# Patient Record
Sex: MALE | Race: WHITE
[De-identification: names, ages, dates, MRNs, and addresses within clinical notes are randomized per-mention and may not be internally consistent; named-entity substitution may affect disease eponyms.]

---

## 2022-03-18 PROBLEM — G89.29 CHRONIC NECK PAIN: Status: ACTIVE | Noted: 2021-04-26

## 2022-03-18 PROBLEM — N40.1 BENIGN LOCALIZED PROSTATIC HYPERPLASIA WITH LOWER URINARY TRACT SYMPTOMS (LUTS): Status: ACTIVE | Noted: 2021-11-08

## 2022-03-18 PROBLEM — I10 PRIMARY HYPERTENSION: Status: ACTIVE | Noted: 2021-12-16

## 2022-03-18 PROBLEM — M54.42 CHRONIC BILATERAL LOW BACK PAIN WITH LEFT-SIDED SCIATICA: Status: ACTIVE | Noted: 2021-04-26

## 2022-03-18 PROBLEM — F33.9 RECURRENT DEPRESSION (HCC): Status: ACTIVE | Noted: 2019-01-22

## 2022-03-18 PROBLEM — G89.29 CHRONIC BILATERAL LOW BACK PAIN WITH LEFT-SIDED SCIATICA: Status: ACTIVE | Noted: 2021-04-26

## 2022-03-18 PROBLEM — M54.40 BACK PAIN OF LUMBAR REGION WITH SCIATICA: Status: ACTIVE | Noted: 2020-10-27

## 2022-03-18 PROBLEM — J30.9 ALLERGIC RHINITIS: Status: ACTIVE | Noted: 2019-01-22

## 2022-03-18 PROBLEM — J06.9 VIRAL URI: Status: ACTIVE | Noted: 2021-11-08

## 2022-03-18 PROBLEM — M54.2 CHRONIC NECK PAIN: Status: ACTIVE | Noted: 2021-04-26

## 2022-03-19 PROBLEM — K21.9 GASTROESOPHAGEAL REFLUX DISEASE WITHOUT ESOPHAGITIS: Status: ACTIVE | Noted: 2021-04-26

## 2022-03-19 PROBLEM — F41.9 ANXIETY: Status: ACTIVE | Noted: 2021-12-16

## 2022-03-19 PROBLEM — G89.29 CHRONIC MIDLINE THORACIC BACK PAIN: Status: ACTIVE | Noted: 2020-10-27

## 2022-03-19 PROBLEM — R41.840 ATTENTION DEFICIT: Status: ACTIVE | Noted: 2019-12-16

## 2022-03-19 PROBLEM — R06.09 CHRONIC DYSPNEA: Status: ACTIVE | Noted: 2021-09-14

## 2022-03-19 PROBLEM — M54.6 CHRONIC MIDLINE THORACIC BACK PAIN: Status: ACTIVE | Noted: 2020-10-27

## 2022-03-19 PROBLEM — J98.6 PARALYSIS, DIAPHRAGM: Status: ACTIVE | Noted: 2020-10-27

## 2022-03-19 PROBLEM — R36.1 HEMATOSPERMIA: Status: ACTIVE | Noted: 2021-11-08

## 2022-03-19 PROBLEM — F51.04 PSYCHOPHYSIOLOGIC INSOMNIA: Status: ACTIVE | Noted: 2019-01-22

## 2022-03-19 PROBLEM — U09.9 POST-COVID SYNDROME: Status: ACTIVE | Noted: 2021-09-14

## 2022-03-19 PROBLEM — R07.89 OTHER CHEST PAIN: Status: ACTIVE | Noted: 2019-11-18

## 2022-03-19 PROBLEM — R58 EXCESSIVE BLEEDING: Status: ACTIVE | Noted: 2019-12-16

## 2022-03-20 PROBLEM — I51.7 MILD CONCENTRIC LEFT VENTRICULAR HYPERTROPHY: Status: ACTIVE | Noted: 2021-11-08

## 2022-03-20 PROBLEM — R06.09 DOE (DYSPNEA ON EXERTION): Status: ACTIVE | Noted: 2020-10-27

## 2022-04-21 PROBLEM — Z12.11 SCREEN FOR COLON CANCER: Status: ACTIVE | Noted: 2022-04-21

## 2022-05-21 PROBLEM — Z12.11 SCREEN FOR COLON CANCER: Status: RESOLVED | Noted: 2022-04-21 | Resolved: 2022-05-21

## 2022-10-13 DIAGNOSIS — F41.9 ANXIETY DISORDER, UNSPECIFIED TYPE: Primary | ICD-10-CM

## 2022-10-14 RX ORDER — ESCITALOPRAM OXALATE 10 MG/1
10 TABLET ORAL DAILY
Qty: 30 TABLET | Refills: 0 | Status: SHIPPED | OUTPATIENT
Start: 2022-10-14 | End: 2022-10-31 | Stop reason: SDUPTHER

## 2022-10-31 ENCOUNTER — OFFICE VISIT (OUTPATIENT)
Dept: FAMILY MEDICINE CLINIC | Facility: CLINIC | Age: 52
End: 2022-10-31
Payer: COMMERCIAL

## 2022-10-31 VITALS
WEIGHT: 214.8 LBS | HEART RATE: 85 BPM | HEIGHT: 71 IN | DIASTOLIC BLOOD PRESSURE: 79 MMHG | BODY MASS INDEX: 30.07 KG/M2 | OXYGEN SATURATION: 96 % | TEMPERATURE: 97.7 F | SYSTOLIC BLOOD PRESSURE: 122 MMHG

## 2022-10-31 DIAGNOSIS — N40.1 BENIGN PROSTATIC HYPERPLASIA WITH URINARY FREQUENCY: ICD-10-CM

## 2022-10-31 DIAGNOSIS — I10 ESSENTIAL (PRIMARY) HYPERTENSION: ICD-10-CM

## 2022-10-31 DIAGNOSIS — R35.0 BENIGN PROSTATIC HYPERPLASIA WITH URINARY FREQUENCY: ICD-10-CM

## 2022-10-31 DIAGNOSIS — F41.9 ANXIETY DISORDER, UNSPECIFIED TYPE: Primary | ICD-10-CM

## 2022-10-31 DIAGNOSIS — Z12.11 SCREEN FOR COLON CANCER: ICD-10-CM

## 2022-10-31 PROCEDURE — 3078F DIAST BP <80 MM HG: CPT | Performed by: FAMILY MEDICINE

## 2022-10-31 PROCEDURE — 3074F SYST BP LT 130 MM HG: CPT | Performed by: FAMILY MEDICINE

## 2022-10-31 PROCEDURE — 99214 OFFICE O/P EST MOD 30 MIN: CPT | Performed by: FAMILY MEDICINE

## 2022-10-31 RX ORDER — ESCITALOPRAM OXALATE 10 MG/1
10 TABLET ORAL DAILY
Qty: 90 TABLET | Refills: 3 | Status: SHIPPED | OUTPATIENT
Start: 2022-10-31

## 2022-10-31 RX ORDER — ESCITALOPRAM OXALATE 10 MG/1
10 TABLET ORAL DAILY
Qty: 30 TABLET | Refills: 0 | Status: SHIPPED | OUTPATIENT
Start: 2022-10-31 | End: 2022-10-31 | Stop reason: SDUPTHER

## 2022-10-31 ASSESSMENT — PATIENT HEALTH QUESTIONNAIRE - PHQ9
6. FEELING BAD ABOUT YOURSELF - OR THAT YOU ARE A FAILURE OR HAVE LET YOURSELF OR YOUR FAMILY DOWN: 0
9. THOUGHTS THAT YOU WOULD BE BETTER OFF DEAD, OR OF HURTING YOURSELF: 0
SUM OF ALL RESPONSES TO PHQ9 QUESTIONS 1 & 2: 0
8. MOVING OR SPEAKING SO SLOWLY THAT OTHER PEOPLE COULD HAVE NOTICED. OR THE OPPOSITE, BEING SO FIGETY OR RESTLESS THAT YOU HAVE BEEN MOVING AROUND A LOT MORE THAN USUAL: 0
3. TROUBLE FALLING OR STAYING ASLEEP: 0
SUM OF ALL RESPONSES TO PHQ QUESTIONS 1-9: 6
4. FEELING TIRED OR HAVING LITTLE ENERGY: 0
SUM OF ALL RESPONSES TO PHQ QUESTIONS 1-9: 6
SUM OF ALL RESPONSES TO PHQ QUESTIONS 1-9: 6
10. IF YOU CHECKED OFF ANY PROBLEMS, HOW DIFFICULT HAVE THESE PROBLEMS MADE IT FOR YOU TO DO YOUR WORK, TAKE CARE OF THINGS AT HOME, OR GET ALONG WITH OTHER PEOPLE: 0
1. LITTLE INTEREST OR PLEASURE IN DOING THINGS: 0
2. FEELING DOWN, DEPRESSED OR HOPELESS: 0
5. POOR APPETITE OR OVEREATING: 3
SUM OF ALL RESPONSES TO PHQ QUESTIONS 1-9: 6
7. TROUBLE CONCENTRATING ON THINGS, SUCH AS READING THE NEWSPAPER OR WATCHING TELEVISION: 3

## 2022-10-31 ASSESSMENT — ENCOUNTER SYMPTOMS
CHEST TIGHTNESS: 0
SHORTNESS OF BREATH: 0
BLOOD IN STOOL: 0
ABDOMINAL PAIN: 0

## 2022-10-31 NOTE — PROGRESS NOTES
Marilyn  _______________________________________  MD Boni Richardson, DO  Danette Perez, MD Augusto Santana MD    [address] , [address] [place] [address]  Phone: [phone]  Fax: [phone]    Marky Benson (:  1970) is a 47 y.o. male,Established patient, here for evaluation of the following chief complaint(s):  No chief complaint on file. ASSESSMENT/PLAN:    1. Anxiety disorder, unspecified type  Stable, continue current regimen. - escitalopram (LEXAPRO) 10 MG tablet; Take 1 tablet by mouth daily  Dispense: 30 tablet; Refill: 0    2. Essential (primary) hypertension  He is in remission at this point. Will trend out at future OV.   - Comprehensive Metabolic Panel; Future  - Lipid Panel; Future    3. Benign prostatic hyperplasia with urinary frequency  Stable, recheck PSA on FU. 4. Screen for colon cancer  Willing to get this done now, will refer. - [facility]    FU 12m    Subjective   SUBJECTIVE/OBJECTIVE:    HTN:  BP better on lisinopril 5. In fact he's cleaned up his diet a bit and he's off the medication and BP is normal.   BP Readings from Last 3 Encounters:   10/31/22 122/79   22 (!) 130/93   21 (!) 126/90     Lab Results   Component Value Date/Time     2021 11:34 AM    K 4.6 2021 11:34 AM     2021 11:34 AM    CO2 24 2021 11:34 AM    BUN 14 2021 11:34 AM    CREATININE 0.91 2021 11:34 AM    GLUCOSE 97 2021 11:34 AM    CALCIUM 9.5 2021 11:34 AM        Anxiety: On chronic Lexparo 10 for this. Mood is good. States he is sleeping alright. BPH: PSA trending down, 3.4->1.8     Lab Results   Component Value Date    PSA 1.8 2022    PSA 3.4 2021       HM:     [place]: Due . .. still. Willing to get this done. Needs a new referral.   Lipids due  COVID: Thinking about this but probably had COVID over the summer. Review of Systems   Constitutional:  Negative for chills and fever. Respiratory:  Negative for chest tightness and shortness of breath. Gastrointestinal:  Negative for abdominal pain and blood in stool. Genitourinary:  Negative for hematuria. Objective   Physical Exam  Vitals and nursing note reviewed. Constitutional:       General: He is not in acute distress. Appearance: Normal appearance. He is not ill-appearing. HENT:      Head: Normocephalic and atraumatic. Right Ear: External ear normal.      Left Ear: External ear normal.      Mouth/Throat:      Mouth: Mucous membranes are moist.   Eyes:      General: No scleral icterus. Right eye: No discharge. Left eye: No discharge. Extraocular Movements: Extraocular movements intact. Pupils: Pupils are equal, round, and reactive to light. Cardiovascular:      Rate and Rhythm: Normal rate and regular rhythm. Pulses: Normal pulses. Heart sounds: No murmur heard. No friction rub. No gallop. Pulmonary:      Effort: Pulmonary effort is normal. No respiratory distress. Breath sounds: Normal breath sounds. Abdominal:      General: Abdomen is flat. Bowel sounds are normal.      Palpations: Abdomen is soft. Tenderness: There is no abdominal tenderness. There is no right CVA tenderness or left CVA tenderness. Musculoskeletal:         General: No swelling or tenderness. Normal range of motion. Cervical back: Normal range of motion and neck supple. No rigidity. Right lower leg: No edema. Left lower leg: No edema. Skin:     General: Skin is warm and dry. Coloration: Skin is not jaundiced or pale. Neurological:      General: No focal deficit present. Mental Status: He is alert and oriented to person, place, and time. Mental status is at baseline. Cranial Nerves: No cranial nerve deficit.       Gait: Gait normal.      Deep Tendon Reflexes: Reflexes normal. Psychiatric:         Mood and Affect: Mood normal.         Behavior: Behavior normal.         Thought Content: Thought content normal.                An electronic signature was used to authenticate this note.     --Dandre Bates MD

## 2022-11-30 PROBLEM — Z12.11 SCREEN FOR COLON CANCER: Status: RESOLVED | Noted: 2022-04-21 | Resolved: 2022-11-30

## 2023-08-15 ENCOUNTER — OFFICE VISIT (OUTPATIENT)
Dept: FAMILY MEDICINE CLINIC | Facility: CLINIC | Age: 53
End: 2023-08-15
Payer: COMMERCIAL

## 2023-08-15 VITALS
DIASTOLIC BLOOD PRESSURE: 88 MMHG | HEIGHT: 71 IN | WEIGHT: 195 LBS | BODY MASS INDEX: 27.3 KG/M2 | SYSTOLIC BLOOD PRESSURE: 134 MMHG

## 2023-08-15 DIAGNOSIS — B35.1 ONYCHOMYCOSIS: ICD-10-CM

## 2023-08-15 DIAGNOSIS — R35.0 BENIGN PROSTATIC HYPERPLASIA WITH URINARY FREQUENCY: ICD-10-CM

## 2023-08-15 DIAGNOSIS — N40.1 BENIGN PROSTATIC HYPERPLASIA WITH URINARY FREQUENCY: ICD-10-CM

## 2023-08-15 DIAGNOSIS — F41.9 ANXIETY DISORDER, UNSPECIFIED TYPE: Primary | ICD-10-CM

## 2023-08-15 DIAGNOSIS — L70.9 ACNE, UNSPECIFIED ACNE TYPE: ICD-10-CM

## 2023-08-15 PROBLEM — Z13.220 SCREENING, LIPID: Status: ACTIVE | Noted: 2022-04-21

## 2023-08-15 PROCEDURE — 3079F DIAST BP 80-89 MM HG: CPT | Performed by: FAMILY MEDICINE

## 2023-08-15 PROCEDURE — 3075F SYST BP GE 130 - 139MM HG: CPT | Performed by: FAMILY MEDICINE

## 2023-08-15 PROCEDURE — 99214 OFFICE O/P EST MOD 30 MIN: CPT | Performed by: FAMILY MEDICINE

## 2023-08-15 RX ORDER — TERBINAFINE HYDROCHLORIDE 250 MG/1
250 TABLET ORAL DAILY
Qty: 30 TABLET | Refills: 3 | Status: SHIPPED | OUTPATIENT
Start: 2023-08-15 | End: 2023-12-13

## 2023-08-15 RX ORDER — CLINDAMYCIN AND BENZOYL PEROXIDE 10; 50 MG/G; MG/G
GEL TOPICAL
Qty: 50 G | Refills: 1 | Status: SHIPPED | OUTPATIENT
Start: 2023-08-15

## 2023-08-15 SDOH — ECONOMIC STABILITY: INCOME INSECURITY: HOW HARD IS IT FOR YOU TO PAY FOR THE VERY BASICS LIKE FOOD, HOUSING, MEDICAL CARE, AND HEATING?: PATIENT DECLINED

## 2023-08-15 SDOH — ECONOMIC STABILITY: HOUSING INSECURITY
IN THE LAST 12 MONTHS, WAS THERE A TIME WHEN YOU DID NOT HAVE A STEADY PLACE TO SLEEP OR SLEPT IN A SHELTER (INCLUDING NOW)?: PATIENT REFUSED

## 2023-08-15 SDOH — ECONOMIC STABILITY: FOOD INSECURITY: WITHIN THE PAST 12 MONTHS, THE FOOD YOU BOUGHT JUST DIDN'T LAST AND YOU DIDN'T HAVE MONEY TO GET MORE.: PATIENT DECLINED

## 2023-08-15 SDOH — ECONOMIC STABILITY: FOOD INSECURITY: WITHIN THE PAST 12 MONTHS, YOU WORRIED THAT YOUR FOOD WOULD RUN OUT BEFORE YOU GOT MONEY TO BUY MORE.: PATIENT DECLINED

## 2023-08-15 ASSESSMENT — PATIENT HEALTH QUESTIONNAIRE - PHQ9
SUM OF ALL RESPONSES TO PHQ QUESTIONS 1-9: 0
5. POOR APPETITE OR OVEREATING: 0
SUM OF ALL RESPONSES TO PHQ9 QUESTIONS 1 & 2: 0
SUM OF ALL RESPONSES TO PHQ QUESTIONS 1-9: 0
2. FEELING DOWN, DEPRESSED OR HOPELESS: 0
8. MOVING OR SPEAKING SO SLOWLY THAT OTHER PEOPLE COULD HAVE NOTICED. OR THE OPPOSITE, BEING SO FIGETY OR RESTLESS THAT YOU HAVE BEEN MOVING AROUND A LOT MORE THAN USUAL: 0
SUM OF ALL RESPONSES TO PHQ QUESTIONS 1-9: 0
4. FEELING TIRED OR HAVING LITTLE ENERGY: 0
3. TROUBLE FALLING OR STAYING ASLEEP: 0
1. LITTLE INTEREST OR PLEASURE IN DOING THINGS: 0
9. THOUGHTS THAT YOU WOULD BE BETTER OFF DEAD, OR OF HURTING YOURSELF: 0
7. TROUBLE CONCENTRATING ON THINGS, SUCH AS READING THE NEWSPAPER OR WATCHING TELEVISION: 0
6. FEELING BAD ABOUT YOURSELF - OR THAT YOU ARE A FAILURE OR HAVE LET YOURSELF OR YOUR FAMILY DOWN: 0
SUM OF ALL RESPONSES TO PHQ QUESTIONS 1-9: 0

## 2023-08-15 ASSESSMENT — ENCOUNTER SYMPTOMS
SHORTNESS OF BREATH: 0
CHEST TIGHTNESS: 0
BLOOD IN STOOL: 0
ABDOMINAL PAIN: 0

## 2023-08-15 NOTE — PROGRESS NOTES
[address]  _______________________________________  MD Aiden Turner, DO  Julia Mora, NP    Ellaree Hamman, MD Aliya Galeana, MD    [address], [address]  Phone: [phone]  Fax: [phone]    Jadon Fry (:  1970) is a 47 y.o. male,Established patient, here for evaluation of the following chief complaint(s):  Rash and Nail Problem         ASSESSMENT/PLAN:    1. Anxiety disorder, unspecified type  Stable, continue current regimen. 2. Benign prostatic hyperplasia with urinary frequency  Reheck at physical in 2m. 3. Onychomycosis  Will do oral lamisil, small toe so should be shorter course, will check LFTs on FU. - terbinafine (LAMISIL) 250 MG tablet; Take 1 tablet by mouth daily  Dispense: 30 tablet; Refill: 3    4. Acne, unspecified acne type  Try clinda with BP. Will recheck on FU. - clindamycin-benzoyl peroxide (BENZACLIN) 1-5 % gel; Apply topically 2 times daily. Dispense: 50 g; Refill: 1    FU 2m as planned    Subjective   SUBJECTIVE/OBJECTIVE:    HTN: Resolved. BP Readings from Last 3 Encounters:   08/15/23 134/88   10/31/22 122/79   22 (!) 130/93     Anxiety: On chronic Lexparo 10 for this. Mood is good. States he is sleeping alright. BPH: PSA trending down, 3.4->1.8     Lab Results   Component Value Date    PSA 1.8 2022    PSA 3.4 2021       Rashes: Has a rash on his chest he wants me to check. Also thick fungal nail he'd like to take care of. Lab Results   Component Value Date    ALT 25 2021    AST 20 2021    ALKPHOS 56 2021    BILITOT 0.3 2021     Has tolerated oral lamisil in the past. He is an occasional drinker. Review of Systems   Constitutional:  Negative for chills and fever. Respiratory:  Negative for chest tightness and shortness of breath. Gastrointestinal:  Negative for abdominal pain and blood in stool.    Genitourinary:  Negative

## 2023-09-14 PROBLEM — Z13.220 SCREENING, LIPID: Status: RESOLVED | Noted: 2022-04-21 | Resolved: 2023-09-14

## 2023-10-03 ENCOUNTER — TELEMEDICINE (OUTPATIENT)
Dept: FAMILY MEDICINE CLINIC | Facility: CLINIC | Age: 53
End: 2023-10-03
Payer: COMMERCIAL

## 2023-10-03 DIAGNOSIS — J01.00 ACUTE NON-RECURRENT MAXILLARY SINUSITIS: Primary | ICD-10-CM

## 2023-10-03 DIAGNOSIS — J30.9 ALLERGIC RHINITIS, UNSPECIFIED SEASONALITY, UNSPECIFIED TRIGGER: ICD-10-CM

## 2023-10-03 PROCEDURE — 99214 OFFICE O/P EST MOD 30 MIN: CPT | Performed by: FAMILY MEDICINE

## 2023-10-03 RX ORDER — AZITHROMYCIN 250 MG/1
250 TABLET, FILM COATED ORAL SEE ADMIN INSTRUCTIONS
Qty: 6 TABLET | Refills: 0 | Status: SHIPPED | OUTPATIENT
Start: 2023-10-03 | End: 2023-10-08

## 2023-10-03 ASSESSMENT — ENCOUNTER SYMPTOMS
ABDOMINAL PAIN: 0
RHINORRHEA: 1
SINUS PAIN: 1
SHORTNESS OF BREATH: 0
BLOOD IN STOOL: 0
CHEST TIGHTNESS: 0

## 2023-10-03 NOTE — PROGRESS NOTES
Sandra Stewart, was evaluated through a synchronous (real-time) audio-video encounter. The patient (or guardian if applicable) is aware that this is a billable service, which includes applicable co-pays. This Virtual Visit was conducted with patient's (and/or legal guardian's) consent. Patient identification was verified, and a caregiver was present when appropriate. The patient was located at Home: [address] [address]  Provider was located at [facility] ([address] [place] [address]): [address] [place] [address]      Sandra Stewart (:  1970) is a Established patient, presenting virtually for evaluation of the following:    Chief Complaint   Patient presents with    Cough     X 1 week     Congestion    Sore Throat         Assessment & Plan   Below is the assessment and plan developed based on review of pertinent history, physical exam, labs, studies, and medications. 1. Acute non-recurrent maxillary sinusitis  Will cover with Zpack due to PCN allergy. Take with food. - azithromycin (ZITHROMAX) 250 MG tablet; Take 1 tablet by mouth See Admin Instructions for 5 days 500mg on day 1 followed by 250mg on days 2 - 5  Dispense: 6 tablet; Refill: 0    2. Allergic rhinitis, unspecified seasonality, unspecified trigger  Continue second gen anithistamine. Call next week if not better. FU PRN      Subjective     On the line with the above:    Feels like he has a sinus infection. Has been having thick green mucus x 1 week. Allergy triggers are making this worse but avoiding triggers doesn't fix him. Has been taking OTC Zyrtec and this has not clearedup his symptoms. Has PCN allergy per his mom, reaction unknown. Review of Systems   Constitutional:  Negative for chills and fever. HENT:  Positive for postnasal drip, rhinorrhea and sinus pain. Respiratory:  Negative for chest tightness and shortness of breath.     Gastrointestinal:  Negative for abdominal pain and

## 2023-10-31 ENCOUNTER — OFFICE VISIT (OUTPATIENT)
Dept: FAMILY MEDICINE CLINIC | Facility: CLINIC | Age: 53
End: 2023-10-31
Payer: COMMERCIAL

## 2023-10-31 VITALS
HEIGHT: 71 IN | WEIGHT: 196 LBS | SYSTOLIC BLOOD PRESSURE: 121 MMHG | DIASTOLIC BLOOD PRESSURE: 89 MMHG | BODY MASS INDEX: 27.44 KG/M2 | HEART RATE: 68 BPM

## 2023-10-31 DIAGNOSIS — Z00.00 ROUTINE GENERAL MEDICAL EXAMINATION AT A HEALTH CARE FACILITY: Primary | ICD-10-CM

## 2023-10-31 DIAGNOSIS — R68.82 LOW LIBIDO: ICD-10-CM

## 2023-10-31 DIAGNOSIS — Z00.00 ROUTINE GENERAL MEDICAL EXAMINATION AT A HEALTH CARE FACILITY: ICD-10-CM

## 2023-10-31 DIAGNOSIS — Z23 NEED FOR VACCINATION: ICD-10-CM

## 2023-10-31 DIAGNOSIS — Z77.21 EXPOSURE TO POTENTIALLY HAZARDOUS BODY FLUIDS: ICD-10-CM

## 2023-10-31 LAB
ALBUMIN SERPL-MCNC: 4 G/DL (ref 3.5–5)
ALBUMIN/GLOB SERPL: 1.4 (ref 0.4–1.6)
ALP SERPL-CCNC: 48 U/L (ref 50–136)
ALT SERPL-CCNC: 23 U/L (ref 12–65)
ANION GAP SERPL CALC-SCNC: 4 MMOL/L (ref 2–11)
AST SERPL-CCNC: 16 U/L (ref 15–37)
BASOPHILS # BLD: 0 K/UL (ref 0–0.2)
BASOPHILS NFR BLD: 1 % (ref 0–2)
BILIRUB SERPL-MCNC: 0.2 MG/DL (ref 0.2–1.1)
BILIRUBIN, URINE, POC: NEGATIVE
BLOOD URINE, POC: NEGATIVE
BUN SERPL-MCNC: 20 MG/DL (ref 6–23)
CALCIUM SERPL-MCNC: 9.3 MG/DL (ref 8.3–10.4)
CHLORIDE SERPL-SCNC: 107 MMOL/L (ref 101–110)
CHOLEST SERPL-MCNC: 204 MG/DL
CO2 SERPL-SCNC: 28 MMOL/L (ref 21–32)
CREAT SERPL-MCNC: 1.1 MG/DL (ref 0.8–1.5)
DIFFERENTIAL METHOD BLD: ABNORMAL
EOSINOPHIL # BLD: 0 K/UL (ref 0–0.8)
EOSINOPHIL NFR BLD: 0 % (ref 0.5–7.8)
ERYTHROCYTE [DISTWIDTH] IN BLOOD BY AUTOMATED COUNT: 13.1 % (ref 11.9–14.6)
GLOBULIN SER CALC-MCNC: 2.9 G/DL (ref 2.8–4.5)
GLUCOSE SERPL-MCNC: 91 MG/DL (ref 65–100)
GLUCOSE URINE, POC: NEGATIVE
HCT VFR BLD AUTO: 41.5 % (ref 41.1–50.3)
HDLC SERPL-MCNC: 60 MG/DL (ref 40–60)
HDLC SERPL: 3.4
HGB BLD-MCNC: 13.8 G/DL (ref 13.6–17.2)
HIV 1+2 AB+HIV1 P24 AG SERPL QL IA: NONREACTIVE
HIV 1/2 RESULT COMMENT: NORMAL
IMM GRANULOCYTES # BLD AUTO: 0 K/UL (ref 0–0.5)
IMM GRANULOCYTES NFR BLD AUTO: 0 % (ref 0–5)
KETONES, URINE, POC: NEGATIVE
LDLC SERPL CALC-MCNC: 130.6 MG/DL
LEUKOCYTE ESTERASE, URINE, POC: NEGATIVE
LYMPHOCYTES # BLD: 2.1 K/UL (ref 0.5–4.6)
LYMPHOCYTES NFR BLD: 38 % (ref 13–44)
MCH RBC QN AUTO: 31.4 PG (ref 26.1–32.9)
MCHC RBC AUTO-ENTMCNC: 33.3 G/DL (ref 31.4–35)
MCV RBC AUTO: 94.5 FL (ref 82–102)
MONOCYTES # BLD: 0.4 K/UL (ref 0.1–1.3)
MONOCYTES NFR BLD: 7 % (ref 4–12)
NEUTS SEG # BLD: 3 K/UL (ref 1.7–8.2)
NEUTS SEG NFR BLD: 54 % (ref 43–78)
NITRITE, URINE, POC: NEGATIVE
NRBC # BLD: 0 K/UL (ref 0–0.2)
PH, URINE, POC: 6.5 (ref 4.6–8)
PLATELET # BLD AUTO: 319 K/UL (ref 150–450)
PMV BLD AUTO: 10.1 FL (ref 9.4–12.3)
POTASSIUM SERPL-SCNC: 4.6 MMOL/L (ref 3.5–5.1)
PROT SERPL-MCNC: 6.9 G/DL (ref 6.3–8.2)
PROTEIN,URINE, POC: NEGATIVE
RBC # BLD AUTO: 4.39 M/UL (ref 4.23–5.6)
SODIUM SERPL-SCNC: 139 MMOL/L (ref 133–143)
SPECIFIC GRAVITY, URINE, POC: 1.02 (ref 1–1.03)
TRIGL SERPL-MCNC: 67 MG/DL (ref 35–150)
TSH, 3RD GENERATION: 0.88 UIU/ML (ref 0.36–3.74)
URINALYSIS CLARITY, POC: CLEAR
URINALYSIS COLOR, POC: YELLOW
UROBILINOGEN, POC: NORMAL
VLDLC SERPL CALC-MCNC: 13.4 MG/DL (ref 6–23)
WBC # BLD AUTO: 5.5 K/UL (ref 4.3–11.1)

## 2023-10-31 PROCEDURE — 99396 PREV VISIT EST AGE 40-64: CPT | Performed by: FAMILY MEDICINE

## 2023-10-31 PROCEDURE — 81003 URINALYSIS AUTO W/O SCOPE: CPT | Performed by: FAMILY MEDICINE

## 2023-10-31 PROCEDURE — 3074F SYST BP LT 130 MM HG: CPT | Performed by: FAMILY MEDICINE

## 2023-10-31 PROCEDURE — 90674 CCIIV4 VAC NO PRSV 0.5 ML IM: CPT | Performed by: FAMILY MEDICINE

## 2023-10-31 PROCEDURE — 3079F DIAST BP 80-89 MM HG: CPT | Performed by: FAMILY MEDICINE

## 2023-10-31 PROCEDURE — 90471 IMMUNIZATION ADMIN: CPT | Performed by: FAMILY MEDICINE

## 2023-10-31 ASSESSMENT — ENCOUNTER SYMPTOMS
SHORTNESS OF BREATH: 0
ABDOMINAL PAIN: 0
CHEST TIGHTNESS: 0
BLOOD IN STOOL: 0

## 2023-10-31 NOTE — PROGRESS NOTES
[address]  _______________________________________  MD Radha Engle, ROSELINE Vasquez, MD Monica Jung MD    [address], [address]  Phone: [phone]  Fax: [phone]    Germain Montoya (:  1970) is a 47 y.o. male,Established patient, here for evaluation of the following chief complaint(s): Annual Exam         ASSESSMENT/PLAN:    1. Routine general medical examination at a health care facility  Low risk lifestyle. - Comprehensive Metabolic Panel; Future  - CBC with Auto Differential; Future  - TSH; Future  - Lipid Panel; Future  - AMB POC URINALYSIS DIP STICK AUTO W/O MICRO  - HIV 1/2 Ag/Ab, 4TH Generation,W Rflx Confirm; Future  - RPR; Future  - Testosterone, free, total; Future    2. Exposure to potentially hazardous body fluids  Is not having unprotected sex or any sex at this point. Will screen HIV/RPR. Talked about PReP. He is not sexually active at this time and does not want to be until he gets to know someone. We discussed risks/benefits and will wait on this until he gets into a situation where it would be appropriate. He will let me know. - HIV 1/2 Ag/Ab, 4TH Generation,W Rflx Confirm; Future  - RPR; Future    3. Need for vaccination  Will do this today. - Influenza, FLUCELVAX, (age 10 mo+), IM, Preservative Free, 0.5 mL    4. Low libido  Check T today while he is here. - Testosterone, free, total; Future    FU 12m if labs OK    Subjective   SUBJECTIVE/OBJECTIVE:      Here for an annual:    HTN: No, has been cutting back on processed food. BP Readings from Last 3 Encounters:   08/15/23 134/88   10/31/22 122/79   22 (!) 130/93     Obesity: No  Wt Readings from Last 3 Encounters:   10/31/23 88.9 kg (196 lb)   08/15/23 88.5 kg (195 lb)   10/31/22 97.4 kg (214 lb 12.8 oz)     Depression: No but treated for anxiety with lexapro 10 long term. He stopped the medication and he feels OK.    No data

## 2023-11-01 LAB — RPR SER QL: NONREACTIVE

## 2023-11-04 LAB
TESTOST FREE SERPL-MCNC: 14.5 PG/ML (ref 7.2–24)
TESTOST SERPL-MCNC: 342 NG/DL (ref 264–916)

## 2023-11-20 ENCOUNTER — TELEMEDICINE (OUTPATIENT)
Dept: FAMILY MEDICINE CLINIC | Facility: CLINIC | Age: 53
End: 2023-11-20
Payer: COMMERCIAL

## 2023-11-20 DIAGNOSIS — G47.26 SHIFT WORK SLEEP DISORDER: ICD-10-CM

## 2023-11-20 DIAGNOSIS — F33.9 RECURRENT DEPRESSION (HCC): Primary | ICD-10-CM

## 2023-11-20 PROCEDURE — 99214 OFFICE O/P EST MOD 30 MIN: CPT | Performed by: FAMILY MEDICINE

## 2023-11-20 RX ORDER — ESCITALOPRAM OXALATE 10 MG/1
10 TABLET ORAL DAILY
COMMUNITY

## 2023-11-20 RX ORDER — ZOLPIDEM TARTRATE 10 MG/1
10 TABLET ORAL NIGHTLY PRN
Qty: 30 TABLET | Refills: 5 | Status: SHIPPED | OUTPATIENT
Start: 2023-11-20 | End: 2024-05-18

## 2023-11-20 ASSESSMENT — ENCOUNTER SYMPTOMS
CHEST TIGHTNESS: 0
BLOOD IN STOOL: 0
SHORTNESS OF BREATH: 0
ABDOMINAL PAIN: 0

## 2023-11-30 PROBLEM — Z00.00 ROUTINE GENERAL MEDICAL EXAMINATION AT A HEALTH CARE FACILITY: Status: RESOLVED | Noted: 2022-04-21 | Resolved: 2023-11-30

## 2023-12-01 ENCOUNTER — OFFICE VISIT (OUTPATIENT)
Dept: UROLOGY | Age: 53
End: 2023-12-01
Payer: COMMERCIAL

## 2023-12-01 DIAGNOSIS — R97.20 ELEVATED PSA: Primary | ICD-10-CM

## 2023-12-01 DIAGNOSIS — E34.9 HYPOTESTOSTERONEMIA: ICD-10-CM

## 2023-12-01 LAB
BILIRUBIN, URINE, POC: NEGATIVE
BLOOD URINE, POC: NEGATIVE
GLUCOSE URINE, POC: NEGATIVE
KETONES, URINE, POC: NEGATIVE
LEUKOCYTE ESTERASE, URINE, POC: NEGATIVE
NITRITE, URINE, POC: NEGATIVE
PH, URINE, POC: 6 (ref 4.6–8)
PROTEIN,URINE, POC: NEGATIVE
SPECIFIC GRAVITY, URINE, POC: 1.01 (ref 1–1.03)
URINALYSIS CLARITY, POC: NORMAL
URINALYSIS COLOR, POC: NORMAL
UROBILINOGEN, POC: NORMAL

## 2023-12-01 PROCEDURE — 99214 OFFICE O/P EST MOD 30 MIN: CPT | Performed by: UROLOGY

## 2023-12-01 PROCEDURE — 81003 URINALYSIS AUTO W/O SCOPE: CPT | Performed by: UROLOGY

## 2023-12-01 NOTE — PROGRESS NOTES
[facility]  [address] [address]  [phone]    Marlon [place]  : 1970     HPI   47 y.o., male returns in follow up for an elevated PSA. Last seen in  for hematospermia. Denies any recent recurrence. PSA was 3.4 on 21; 1.8 on 3/23/22 and is now 4.4 on 11/10/23. Test was 20 on same date. Reports fatigue and low libido. Seen at [facility] but referred for his elevated. Reports recent freq that has since resolved. No FH of CaP. Past Medical History:   Diagnosis Date    Depression     Kidney stone      Past Surgical History:   Procedure Laterality Date    APPENDECTOMY       Current Outpatient Medications   Medication Sig Dispense Refill    zolpidem (AMBIEN) 10 MG tablet Take 1 tablet by mouth nightly as needed for Sleep for up to 180 days. Max Daily Amount: 10 mg 30 tablet 5    escitalopram (LEXAPRO) 10 MG tablet Take 1 tablet by mouth daily (Patient not taking: Reported on 2023)      clindamycin-benzoyl peroxide (BENZACLIN) 1-5 % gel Apply topically 2 times daily. (Patient not taking: Reported on 2023) 50 g 1    terbinafine (LAMISIL) 250 MG tablet Take 1 tablet by mouth daily (Patient not taking: Reported on 2023) 30 tablet 3     No current facility-administered medications for this visit.      Allergies   Allergen Reactions    Penicillins Other (See Comments)     Per his mother    Ace Inhibitors Cough     Social History     Socioeconomic History    Marital status: Single     Spouse name: Not on file    Number of children: Not on file    Years of education: Not on file    Highest education level: Not on file   Occupational History    Not on file   Tobacco Use    Smoking status: Never    Smokeless tobacco: Never   Substance and Sexual Activity    Alcohol use: Yes    Drug use: No    Sexual activity: Not on file   Other Topics Concern    Not on file   Social History Narrative    Not on file     Social Determinants of Health

## 2023-12-02 LAB
PSA FREE MFR SERPL: 9.2 %
PSA FREE SERPL-MCNC: 0.6 NG/ML
PSA SERPL-MCNC: 6.5 NG/ML

## 2023-12-13 ENCOUNTER — OFFICE VISIT (OUTPATIENT)
Dept: FAMILY MEDICINE CLINIC | Facility: CLINIC | Age: 53
End: 2023-12-13
Payer: COMMERCIAL

## 2023-12-13 VITALS
HEIGHT: 71 IN | SYSTOLIC BLOOD PRESSURE: 137 MMHG | WEIGHT: 196 LBS | DIASTOLIC BLOOD PRESSURE: 91 MMHG | HEART RATE: 85 BPM | BODY MASS INDEX: 27.44 KG/M2

## 2023-12-13 DIAGNOSIS — E55.9 AVITAMINOSIS D: ICD-10-CM

## 2023-12-13 DIAGNOSIS — D53.1 MEGALOBLASTIC ERYTHROCYTES: ICD-10-CM

## 2023-12-13 DIAGNOSIS — R97.20 ELEVATED PSA: Primary | ICD-10-CM

## 2023-12-13 LAB
25(OH)D3 SERPL-MCNC: 34.4 NG/ML (ref 30–100)
VIT B12 SERPL-MCNC: 477 PG/ML (ref 193–986)

## 2023-12-13 PROCEDURE — 99214 OFFICE O/P EST MOD 30 MIN: CPT | Performed by: FAMILY MEDICINE

## 2023-12-13 PROCEDURE — 3080F DIAST BP >= 90 MM HG: CPT | Performed by: FAMILY MEDICINE

## 2023-12-13 PROCEDURE — 3075F SYST BP GE 130 - 139MM HG: CPT | Performed by: FAMILY MEDICINE

## 2023-12-13 ASSESSMENT — ENCOUNTER SYMPTOMS
ABDOMINAL PAIN: 0
CHEST TIGHTNESS: 0
BLOOD IN STOOL: 0
SHORTNESS OF BREATH: 0

## 2023-12-13 NOTE — PROGRESS NOTES
[address]  _______________________________________  MD Cody Florez, ROSELINE Romero Mai, MD Kelsi Henderson MD    [address], [address]  Phone: [phone]  Fax: [phone]    Thao Enciso (:  1970) is a 47 y.o. male,Established patient, here for evaluation of the following chief complaint(s): Other ([place] for intermintent leave for appointments for elevated PSA )         ASSESSMENT/PLAN:    1. Elevated PSA  Had a long talk about what's in store for him here in the near term. Appreciate Dr. Mervat Amin helping here, I filled out the [facility] papers for him. 2. Avitaminosis D  Check D while he is here. - Vitamin D 25 Hydroxy; Future    3. Megaloblastic erythrocytes  Regarding his fatigue, MCV is steadily increasing x 3 years. He is already on OTC B12 supplements, will check his B12 today. - Vitamin B12; Future        FU as previously scheduled. Subjective   SUBJECTIVE/OBJECTIVE:    Elevated PSA: Followed by urology. But he's here asking me to fill out [facility] paperwork for those visits. He states this is because HR told him to do this as I am is PCP. Asking for intermittent leave because of imaging and FU visits. MRI prostate is pending for this Friday at another location. States he has been really run down the last couple weeks.      Lab Results   Component Value Date    WBC 5.5 10/31/2023    HGB 13.8 10/31/2023    HCT 41.5 10/31/2023    MCV 94.5 10/31/2023     10/31/2023     Lab Results   Component Value Date     10/31/2023    K 4.6 10/31/2023     10/31/2023    CO2 28 10/31/2023    BUN 20 10/31/2023    CREATININE 1.10 10/31/2023    GLUCOSE 91 10/31/2023    CALCIUM 9.3 10/31/2023    PROT 6.9 10/31/2023    LABALBU 4.0 10/31/2023    BILITOT 0.2 10/31/2023    ALKPHOS 48 (L) 10/31/2023    AST 16 10/31/2023    ALT 23 10/31/2023    LABGLOM >60 10/31/2023    GFRAA 113 2021    AGRATIO

## 2023-12-14 RX ORDER — ERGOCALCIFEROL 1.25 MG/1
50000 CAPSULE ORAL WEEKLY
Qty: 12 CAPSULE | Refills: 1 | Status: SHIPPED | OUTPATIENT
Start: 2023-12-14

## 2023-12-15 ENCOUNTER — HOSPITAL ENCOUNTER (OUTPATIENT)
Dept: MRI IMAGING | Age: 53
Discharge: HOME | End: 2023-12-15
Attending: UROLOGY
Payer: COMMERCIAL

## 2023-12-15 DIAGNOSIS — R97.20 ELEVATED PSA: ICD-10-CM

## 2023-12-15 PROCEDURE — 6360000004 HC RX CONTRAST MEDICATION: Performed by: UROLOGY

## 2023-12-15 PROCEDURE — 2580000003 HC RX 258: Performed by: UROLOGY

## 2023-12-15 PROCEDURE — A9579 GAD-BASE MR CONTRAST NOS,1ML: HCPCS | Performed by: UROLOGY

## 2023-12-15 PROCEDURE — 72197 MRI PELVIS W/O & W/DYE: CPT

## 2023-12-15 RX ORDER — SODIUM CHLORIDE 0.9 % (FLUSH) 0.9 %
30 SYRINGE (ML) INJECTION AS NEEDED
Status: ACTIVE | OUTPATIENT
Start: 2023-12-15

## 2023-12-15 RX ADMIN — SODIUM CHLORIDE, PRESERVATIVE FREE 30 ML: 5 INJECTION INTRAVENOUS at 21:39

## 2023-12-15 RX ADMIN — GADOTERIDOL 18 ML: 279.3 INJECTION, SOLUTION INTRAVENOUS at 21:39

## 2023-12-19 ENCOUNTER — TELEPHONE (OUTPATIENT)
Dept: UROLOGY | Age: 53
End: 2023-12-19

## 2023-12-27 ENCOUNTER — TELEPHONE (OUTPATIENT)
Dept: UROLOGY | Age: 53
End: 2023-12-27

## 2023-12-27 DIAGNOSIS — R97.20 ELEVATED PROSTATE SPECIFIC ANTIGEN (PSA): ICD-10-CM

## 2023-12-27 NOTE — TELEPHONE ENCOUNTER
----- Message from Chi Isaacs DO sent at 12/26/2023 11:30 AM EST -----  I reviewed with pt.  He has elected to proceed with MRI fusion biopsies of the prostate. All risks, benefits and alternatives to the above mentioned procedure were discussed and the patient is willing to proceed at this time.  30min  Ua day of  Outpt  Rocephin 1g IV preop  I sent cipro

## 2023-12-28 ENCOUNTER — PREP FOR PROCEDURE (OUTPATIENT)
Dept: UROLOGY | Age: 53
End: 2023-12-28

## 2023-12-28 DIAGNOSIS — C61 PROSTATE CANCER (HCC): Primary | ICD-10-CM

## 2023-12-28 PROBLEM — R97.20 ELEVATED PROSTATE SPECIFIC ANTIGEN (PSA): Status: ACTIVE | Noted: 2023-12-27

## 2023-12-28 NOTE — TELEPHONE ENCOUNTER
Procedures: Procedure(s):   PROSTATE BIOPSY FUSION   Date: 1/29/2024   Time: 0830   Location: [facility] MAIN OR

## 2024-01-26 RX ORDER — ACETAMINOPHEN 500 MG
1000 TABLET ORAL 2 TIMES DAILY
COMMUNITY
Start: 2024-01-28 | End: 2024-01-29

## 2024-01-26 NOTE — PERIOP NOTE
Phone pre-assessment completed.    Verified name&  . Order to obtain consent  found in EHR &  matches case posting.    Type 1A surgery,  assessment complete.  Orders  received.    Labs per surgeon: UA in preop  Labs per anesthesia protocol: none      Patient answered medical/surgical history questions at their best of ability. All prior to admission medications documented in EPIC.    Patient instructed to continue all prescribed medications unless otherwise instructed below:    Prescription meds to hold:none    Patient instructed to take ONLY the following medications the day of surgery according to anesthesia guidelines with a small sip of water: antibiotic if provided by surgeon     If you have never been diagnosed with liver disease, take Acetaminophen 1000mg in the morning and then again before bed one day prior to surgery date.     Please stop all vitamins & supplements 7 days prior to surgery and stop all NSAIDS ( ASA/Excedrin/BC & Goody Powder, ibuprofen/Motrin/Advil, naproxen/Aleve) 5 days before your surgery. Should you have a surgery date that does not allow for the amount of time instructed above, please stop taking vitamins, supplements, and NSAIDS IMMEDIATELY.      Instructed on the following:    > Arrive at [address] [place] [address] (enter at front entrance by [address])   Entrance, time of arrival to be called the day before by 1700  > NPO after midnight including gum, mints, and ice chips  > Responsible adult must drive patient to the hospital, stay during surgery, and patient will need supervision 24 hours after anesthesia  > Use antibacterial soap in shower the night before surgery and on the morning of surgery  > All piercings must be removed prior to arrival.    > Leave all valuables (money and jewelry) at home but bring insurance card and ID on DOS.   > You may be required to pay a deductible or co-pay on the day of your procedure. You can pre-pay by calling [phone] if

## 2024-01-28 ENCOUNTER — ANESTHESIA EVENT (OUTPATIENT)
Dept: SURGERY | Age: 54
End: 2024-01-28
Payer: COMMERCIAL

## 2024-01-29 ENCOUNTER — HOSPITAL ENCOUNTER (OUTPATIENT)
Age: 54
Setting detail: OUTPATIENT SURGERY
Discharge: HOME | End: 2024-01-29
Attending: UROLOGY | Admitting: UROLOGY
Payer: COMMERCIAL

## 2024-01-29 ENCOUNTER — ANESTHESIA (OUTPATIENT)
Dept: SURGERY | Age: 54
End: 2024-01-29
Payer: COMMERCIAL

## 2024-01-29 VITALS
OXYGEN SATURATION: 96 % | DIASTOLIC BLOOD PRESSURE: 87 MMHG | TEMPERATURE: 97.8 F | HEIGHT: 71 IN | RESPIRATION RATE: 14 BRPM | HEART RATE: 67 BPM | BODY MASS INDEX: 28.56 KG/M2 | WEIGHT: 204 LBS | SYSTOLIC BLOOD PRESSURE: 132 MMHG

## 2024-01-29 LAB
APPEARANCE UR: CLEAR
BILIRUB UR QL: NEGATIVE
COLOR UR: NORMAL
GLUCOSE UR STRIP.AUTO-MCNC: NEGATIVE MG/DL
HGB UR QL STRIP: NEGATIVE
KETONES UR QL STRIP.AUTO: NEGATIVE MG/DL
LEUKOCYTE ESTERASE UR QL STRIP.AUTO: NEGATIVE
NITRITE UR QL STRIP.AUTO: NEGATIVE
PH UR STRIP: 5.5 (ref 5–9)
PROT UR STRIP-MCNC: NEGATIVE MG/DL
SP GR UR REFRACTOMETRY: 1.02 (ref 1–1.02)
UROBILINOGEN UR QL STRIP.AUTO: 0.2 EU/DL (ref 0.2–1)

## 2024-01-29 PROCEDURE — 76872 US TRANSRECTAL: CPT | Performed by: UROLOGY

## 2024-01-29 PROCEDURE — 6360000002 HC RX W HCPCS: Performed by: NURSE ANESTHETIST, CERTIFIED REGISTERED

## 2024-01-29 PROCEDURE — 2709999900 HC NON-CHARGEABLE SUPPLY: Performed by: UROLOGY

## 2024-01-29 PROCEDURE — 6360000002 HC RX W HCPCS: Performed by: UROLOGY

## 2024-01-29 PROCEDURE — 3600000002 HC SURGERY LEVEL 2 BASE: Performed by: UROLOGY

## 2024-01-29 PROCEDURE — 55700 PR PROSTATE NEEDLE BIOPSY ANY APPROACH: CPT | Performed by: UROLOGY

## 2024-01-29 PROCEDURE — 2580000003 HC RX 258: Performed by: ANESTHESIOLOGY

## 2024-01-29 PROCEDURE — 2500000003 HC RX 250 WO HCPCS: Performed by: NURSE ANESTHETIST, CERTIFIED REGISTERED

## 2024-01-29 PROCEDURE — 81003 URINALYSIS AUTO W/O SCOPE: CPT

## 2024-01-29 PROCEDURE — 7100000000 HC PACU RECOVERY - FIRST 15 MIN: Performed by: UROLOGY

## 2024-01-29 PROCEDURE — 7100000001 HC PACU RECOVERY - ADDTL 15 MIN: Performed by: UROLOGY

## 2024-01-29 PROCEDURE — 6370000000 HC RX 637 (ALT 250 FOR IP): Performed by: ANESTHESIOLOGY

## 2024-01-29 PROCEDURE — 3700000001 HC ADD 15 MINUTES (ANESTHESIA): Performed by: UROLOGY

## 2024-01-29 PROCEDURE — 88305 TISSUE EXAM BY PATHOLOGIST: CPT

## 2024-01-29 PROCEDURE — 2580000003 HC RX 258: Performed by: UROLOGY

## 2024-01-29 PROCEDURE — 7100000010 HC PHASE II RECOVERY - FIRST 15 MIN: Performed by: UROLOGY

## 2024-01-29 PROCEDURE — 3700000000 HC ANESTHESIA ATTENDED CARE: Performed by: UROLOGY

## 2024-01-29 PROCEDURE — 3600000012 HC SURGERY LEVEL 2 ADDTL 15MIN: Performed by: UROLOGY

## 2024-01-29 RX ORDER — SODIUM CHLORIDE, SODIUM LACTATE, POTASSIUM CHLORIDE, CALCIUM CHLORIDE 600; 310; 30; 20 MG/100ML; MG/100ML; MG/100ML; MG/100ML
INJECTION, SOLUTION INTRAVENOUS CONTINUOUS
Status: DISCONTINUED | OUTPATIENT
Start: 2024-01-29 | End: 2024-01-29 | Stop reason: HOSPADM

## 2024-01-29 RX ORDER — LIDOCAINE HYDROCHLORIDE 20 MG/ML
INJECTION, SOLUTION EPIDURAL; INFILTRATION; INTRACAUDAL; PERINEURAL PRN
Status: DISCONTINUED | OUTPATIENT
Start: 2024-01-29 | End: 2024-01-29 | Stop reason: SDUPTHER

## 2024-01-29 RX ORDER — ONDANSETRON 2 MG/ML
4 INJECTION INTRAMUSCULAR; INTRAVENOUS
Status: DISCONTINUED | OUTPATIENT
Start: 2024-01-29 | End: 2024-01-29 | Stop reason: HOSPADM

## 2024-01-29 RX ORDER — SODIUM CHLORIDE 9 MG/ML
INJECTION, SOLUTION INTRAVENOUS PRN
Status: DISCONTINUED | OUTPATIENT
Start: 2024-01-29 | End: 2024-01-29 | Stop reason: HOSPADM

## 2024-01-29 RX ORDER — OXYCODONE HYDROCHLORIDE 5 MG/1
5 TABLET ORAL PRN
Status: DISCONTINUED | OUTPATIENT
Start: 2024-01-29 | End: 2024-01-29 | Stop reason: HOSPADM

## 2024-01-29 RX ORDER — PROPOFOL 10 MG/ML
INJECTION, EMULSION INTRAVENOUS PRN
Status: DISCONTINUED | OUTPATIENT
Start: 2024-01-29 | End: 2024-01-29 | Stop reason: SDUPTHER

## 2024-01-29 RX ORDER — ACETAMINOPHEN 500 MG
1000 TABLET ORAL ONCE
Status: COMPLETED | OUTPATIENT
Start: 2024-01-29 | End: 2024-01-29

## 2024-01-29 RX ORDER — SODIUM CHLORIDE 0.9 % (FLUSH) 0.9 %
5-40 SYRINGE (ML) INJECTION EVERY 12 HOURS SCHEDULED
Status: DISCONTINUED | OUTPATIENT
Start: 2024-01-29 | End: 2024-01-29 | Stop reason: HOSPADM

## 2024-01-29 RX ORDER — FAMOTIDINE 20 MG/1
20 TABLET, FILM COATED ORAL ONCE
Status: COMPLETED | OUTPATIENT
Start: 2024-01-29 | End: 2024-01-29

## 2024-01-29 RX ORDER — OXYCODONE HYDROCHLORIDE 5 MG/1
10 TABLET ORAL PRN
Status: DISCONTINUED | OUTPATIENT
Start: 2024-01-29 | End: 2024-01-29 | Stop reason: HOSPADM

## 2024-01-29 RX ORDER — SODIUM CHLORIDE 0.9 % (FLUSH) 0.9 %
5-40 SYRINGE (ML) INJECTION PRN
Status: DISCONTINUED | OUTPATIENT
Start: 2024-01-29 | End: 2024-01-29 | Stop reason: HOSPADM

## 2024-01-29 RX ORDER — FENTANYL CITRATE 50 UG/ML
100 INJECTION, SOLUTION INTRAMUSCULAR; INTRAVENOUS
Status: DISCONTINUED | OUTPATIENT
Start: 2024-01-29 | End: 2024-01-29 | Stop reason: HOSPADM

## 2024-01-29 RX ORDER — LIDOCAINE HYDROCHLORIDE 10 MG/ML
1 INJECTION, SOLUTION INFILTRATION; PERINEURAL
Status: DISCONTINUED | OUTPATIENT
Start: 2024-01-29 | End: 2024-01-29 | Stop reason: HOSPADM

## 2024-01-29 RX ORDER — DIPHENHYDRAMINE HYDROCHLORIDE 50 MG/ML
12.5 INJECTION INTRAMUSCULAR; INTRAVENOUS
Status: DISCONTINUED | OUTPATIENT
Start: 2024-01-29 | End: 2024-01-29 | Stop reason: HOSPADM

## 2024-01-29 RX ORDER — SODIUM CHLORIDE 9 MG/ML
INJECTION, SOLUTION INTRAVENOUS CONTINUOUS
Status: DISCONTINUED | OUTPATIENT
Start: 2024-01-29 | End: 2024-01-29 | Stop reason: HOSPADM

## 2024-01-29 RX ORDER — HYDROMORPHONE HYDROCHLORIDE 2 MG/ML
0.25 INJECTION, SOLUTION INTRAMUSCULAR; INTRAVENOUS; SUBCUTANEOUS EVERY 5 MIN PRN
Status: DISCONTINUED | OUTPATIENT
Start: 2024-01-29 | End: 2024-01-29 | Stop reason: HOSPADM

## 2024-01-29 RX ORDER — MIDAZOLAM HYDROCHLORIDE 2 MG/2ML
2 INJECTION, SOLUTION INTRAMUSCULAR; INTRAVENOUS
Status: DISCONTINUED | OUTPATIENT
Start: 2024-01-29 | End: 2024-01-29 | Stop reason: HOSPADM

## 2024-01-29 RX ORDER — HYDROMORPHONE HYDROCHLORIDE 2 MG/ML
0.5 INJECTION, SOLUTION INTRAMUSCULAR; INTRAVENOUS; SUBCUTANEOUS EVERY 10 MIN PRN
Status: DISCONTINUED | OUTPATIENT
Start: 2024-01-29 | End: 2024-01-29 | Stop reason: HOSPADM

## 2024-01-29 RX ADMIN — SODIUM CHLORIDE, SODIUM LACTATE, POTASSIUM CHLORIDE, AND CALCIUM CHLORIDE: 600; 310; 30; 20 INJECTION, SOLUTION INTRAVENOUS at 07:36

## 2024-01-29 RX ADMIN — WATER 1000 MG: 1 INJECTION INTRAMUSCULAR; INTRAVENOUS; SUBCUTANEOUS at 07:41

## 2024-01-29 RX ADMIN — PROPOFOL 50 MG: 10 INJECTION, EMULSION INTRAVENOUS at 07:44

## 2024-01-29 RX ADMIN — FAMOTIDINE 20 MG: 20 TABLET, FILM COATED ORAL at 06:39

## 2024-01-29 RX ADMIN — PROPOFOL 50 MG: 10 INJECTION, EMULSION INTRAVENOUS at 07:49

## 2024-01-29 RX ADMIN — ACETAMINOPHEN 1000 MG: 500 TABLET ORAL at 06:39

## 2024-01-29 RX ADMIN — LIDOCAINE HYDROCHLORIDE 100 MG: 20 INJECTION, SOLUTION EPIDURAL; INFILTRATION; INTRACAUDAL; PERINEURAL at 07:40

## 2024-01-29 RX ADMIN — PROPOFOL 50 MG: 10 INJECTION, EMULSION INTRAVENOUS at 07:46

## 2024-01-29 RX ADMIN — SODIUM CHLORIDE, SODIUM LACTATE, POTASSIUM CHLORIDE, AND CALCIUM CHLORIDE: 600; 310; 30; 20 INJECTION, SOLUTION INTRAVENOUS at 06:40

## 2024-01-29 RX ADMIN — PROPOFOL 50 MG: 10 INJECTION, EMULSION INTRAVENOUS at 07:52

## 2024-01-29 RX ADMIN — PROPOFOL 50 MG: 10 INJECTION, EMULSION INTRAVENOUS at 07:40

## 2024-01-29 RX ADMIN — PROPOFOL 50 MG: 10 INJECTION, EMULSION INTRAVENOUS at 07:42

## 2024-01-29 ASSESSMENT — PAIN - FUNCTIONAL ASSESSMENT: PAIN_FUNCTIONAL_ASSESSMENT: 0-10

## 2024-01-29 NOTE — OP NOTE
[facility]  OPERATIVE REPORT    Name:  MATTHIEU THACKER  MR#:  [id number]  :  1970  ACCOUNT #:  [id number]  DATE OF SERVICE:  2024    PREOPERATIVE DIAGNOSIS:  Elevated prostate-specific antigen.    POSTOPERATIVE DIAGNOSIS:  Elevated prostate-specific antigen.    PROCEDURE PERFORMED:  MRI fusion biopsies of the prostate.    SURGEON:  Chi Isaacs DO    ASSISTANT:  None.    ANESTHESIA:  MAC.    COMPLICATIONS:  None immediate.    SPECIMENS REMOVED:  Prostate biopsies.    IMPLANTS:  None.    ESTIMATED BLOOD LOSS:  Less than 5 mL.    CLINICAL HISTORY:  This is a 53-year-old gentleman recently found to have an elevated PSA of 6.5 on 2023.  An MRI on 2023 shows a PI-RADS 4 lesion at the right mid gland.  All risks, benefits and alternatives to the above-mentioned procedure have been reviewed and he is willing to proceed at this time.    DESCRIPTION OF OPERATIVE PROCEDURE:  Patient consent was obtained.  The patient was brought back to the operating room at which time he was placed in a modified right lateral decubitus position.  All pressure points were carefully padded and the patient was secured to the table.  After the uneventful induction of MAC anesthesia, a digital rectal examination was performed.  The right lobe of the prostate was firmer than the left; however, there was no discrete mass.  The transrectal ultrasound probe was then inserted into the rectum.  The prostate was surveyed.  The prostate was mildly heterogeneous in nature.  No obvious lesions were seen.  Volumetric measurements were obtained.  Calculated prostate volume was 41 cubic centimeters.  Using the UroNav software, the previously marked MRI images were fused to the ultrasound images.  There was one region of interest at the right mid gland.  This area was biopsied several times using the software.  In addition, a standard sextant biopsy of the prostate was performed.  Three far lateral biopsies were

## 2024-01-29 NOTE — ANESTHESIA POSTPROCEDURE EVALUATION
Department of Anesthesiology  Postprocedure Note    Patient: Gabe Ray  MRN: [id number]  YOB: 1970  Date of evaluation: 1/29/2024    Procedure Summary     Date: 01/29/24 Room / Location: [facility] MAIN OR  / [facility] MAIN OR    Anesthesia Start: 0736 Anesthesia Stop: 0801    Procedure: PROSTATE BIOPSY FUSION (Anus) Diagnosis:       Elevated prostate specific antigen (PSA)      (Elevated prostate specific antigen (PSA) [R97.20])    Providers: Chi Isaacs DO Responsible Provider: Any Kirby MD    Anesthesia Type: MAC ASA Status: 2          Anesthesia Type: MAC    Ruthy Phase I: Ruthy Score: 7    Ruthy Phase II: Ruthy Score: 10    Anesthesia Post Evaluation    Patient location during evaluation: PACU  Patient participation: complete - patient participated  Level of consciousness: awake and alert  Airway patency: patent  Nausea & Vomiting: no nausea  Cardiovascular status: hemodynamically stable  Respiratory status: acceptable  Hydration status: euvolemic  Pain management: adequate and satisfactory to patient        No notable events documented.

## 2024-01-29 NOTE — DISCHARGE INSTRUCTIONS
Tylenol 500mg po q6h prn discomfort.  Finish Cipro course.  RTO in 2 wks.     If you have had surgery in the past 7-10 days by one of our providers and are having fever, bleeding, or drainage from an incision, have an opening in an incision, or having issues urinating properly, please call [phone].    Prostate Biopsy and Ultrasound:   A prostate biopsy is a type of test. Your doctor takes small tissue samples from your prostate gland. Then another doctor looks at the tissue under a microscope to see if there are cancer cells.  This test is done by a doctor who specializes in men's genital and urinary problems (urologist). It can be done in your doctor's office, a day surgery clinic, or a hospital operating room. To get the tissue samples from the prostate, the doctor inserts a thin needle through the rectum, the urethra, or the area between the anus and scrotum (perineum). The most common method is through the rectum. Your doctor may use ultrasound to help guide the needle.  What else should you know about this test?  A prostate biopsy has a slight risk of causing problems such as infection or bleeding.  If the biopsy went through your rectum, you may have a small amount of bleeding from your rectum for 2 to 3 days after the biopsy.  You may have a little pain in your pelvic area. You may also have a little blood in your urine for 1 to 5 days.  You may have some blood in your semen for a week or longer.  Do not do heavy work or exercise for 4 hours after the test.  Your doctor will tell you how long it may take to get your results back.  Follow-up care is a key part of your treatment and safety. Be sure to make and go to all appointments, and call your doctor if you are having problems. It's also a good idea to keep a list of the medicines you take. Ask your doctor when you can expect to have your test results.    After general anesthesia or intravenous sedation, for 24 hours or while taking prescription

## 2024-01-29 NOTE — ANESTHESIA PRE PROCEDURE
Department of Anesthesiology  Preprocedure Note       Name:  Gabe Ray   Age:  53 y.o.  :  1970                                          MRN:  [id number]         Date:  2024      Surgeon: Surgeon(s):  Chi Isaacs DO    Procedure: Procedure(s):  PROSTATE BIOPSY FUSION    Medications prior to admission:   Prior to Admission medications    Medication Sig Start Date End Date Taking? Authorizing Provider   acetaminophen (TYLENOL) 500 MG tablet Take 2 tablets by mouth in the morning and at bedtime 24 Yes Provider, MD Jose   vitamin D (ERGOCALCIFEROL) 1.25 MG (78414 UT) CAPS capsule Take 1 capsule by mouth once a week 23   Arjun Robert MD   escitalopram (LEXAPRO) 10 MG tablet Take 1 tablet by mouth nightly    Provider, MD Jose   zolpidem (AMBIEN) 10 MG tablet Take 1 tablet by mouth nightly as needed for Sleep for up to 180 days. Max Daily Amount: 10 mg 23  Arjun Robert MD       Current medications:    Current Facility-Administered Medications   Medication Dose Route Frequency Provider Last Rate Last Admin    lidocaine 1 % injection 1 mL  1 mL IntraDERmal Once PRN Any Kirby MD        fentaNYL (SUBLIMAZE) injection 100 mcg  100 mcg IntraVENous Once PRN Any Kirby MD        0.9 % sodium chloride infusion   IntraVENous Continuous Any Kirby MD        lactated ringers IV soln infusion   IntraVENous Continuous Any Kirby  mL/hr at 24 0640 New Bag at 24 0640    sodium chloride flush 0.9 % injection 5-40 mL  5-40 mL IntraVENous 2 times per day Any Kirby MD        sodium chloride flush 0.9 % injection 5-40 mL  5-40 mL IntraVENous PRN Any Kirby MD        0.9 % sodium chloride infusion   IntraVENous PRN Any Kirby MD        midazolam PF (VERSED) injection 2 mg  2 mg IntraVENous Once PRN Any Kirby MD        cefTRIAXone (ROCEPHIN) 1,000 mg in sterile

## 2024-01-29 NOTE — BRIEF OP NOTE
Brief Postoperative Note      Patient: Gabe Ray  YOB: 1970  MRN: [id number]    Date of Procedure: 1/29/2024    Pre-Op Diagnosis Codes:     * Elevated prostate specific antigen (PSA) [R97.20]    Post-Op Diagnosis: Same       Procedure(s):  PROSTATE BIOPSY FUSION    Surgeon(s):  Winsome Bazan,     Assistant:  * No surgical staff found *    Anesthesia: Monitor Anesthesia Care    Estimated Blood Loss (mL): <5cc    Complications: none immediate    Specimens:   ID Type Source Tests Collected by Time Destination   A : LLB Tissue Prostate SURGICAL PATHOLOGY Gregorio Bazanuel P, DO 1/29/2024 0631    B : LLM Tissue Prostate SURGICAL PATHOLOGY York, Winsome P, DO 1/29/2024 0633    C : LLA Tissue Prostate SURGICAL PATHOLOGY Winsome Bazan P, DO 1/29/2024 0633    D : LB Tissue Prostate SURGICAL PATHOLOGY Winsome Bazan P, DO 1/29/2024 0633    E : LM Tissue Prostate SURGICAL PATHOLOGY Winsome Bazan P, DO 1/29/2024 0633    F : LA Tissue Prostate SURGICAL PATHOLOGY Winsome Bazan P, DO 1/29/2024 0633    G : RLB Tissue Prostate SURGICAL PATHOLOGY Winsome Bazan P, DO 1/29/2024 0633    H : RLM Tissue Prostate SURGICAL PATHOLOGY Winsome Bazan P, DO 1/29/2024 0633    I : RLA Tissue Prostate SURGICAL PATHOLOGY Winsome Bazan P, DO 1/29/2024 0633    J : RB Tissue Prostate SURGICAL PATHOLOGY Winsome Bazan P, DO 1/29/2024 0633    K : RM Tissue Prostate SURGICAL PATHOLOGY Winsome Bazan P, DO 1/29/2024 0633    L : RA Tissue Prostate SURGICAL PATHOLOGY Winsome Bazan P, DO 1/29/2024 0633    M : JORGE LUIS -1 RIGHT MID Tissue Prostate SURGICAL PATHOLOGY Winsome Bazan P, DO 1/29/2024 0633        Implants:  * No implants in log *      Drains: * No LDAs found *    Findings: see op note      Electronically signed by WINSOME BAZAN DO on 1/29/2024 at 7:59 AM

## 2024-01-29 NOTE — H&P
auscultation bilaterally  Heart - normal rate, regular rhythm  Abdomen - soft, nontender, nondistended, no masses or organomegaly  Neurological - normal speech, no focal findings or movement disorder noted  Skin - normal coloration and turgor      Urinalysis  UA - Dipstick  Results for orders placed or performed during the hospital encounter of 01/29/24   Urinalysis   Result Value Ref Range    Color, UA YELLOW/STRAW      Appearance CLEAR      Specific Gravity, UA 1.023 1.001 - 1.023      pH, Urine 5.5 5.0 - 9.0      Protein, UA Negative NEG mg/dL    Glucose, UA Negative mg/dL    Ketones, Urine Negative NEG mg/dL    Bilirubin Urine Negative NEG      Blood, Urine Negative NEG      Urobilinogen, Urine 0.2 0.2 - 1.0 EU/dL    Nitrite, Urine Negative NEG      Leukocyte Esterase, Urine Negative NEG           Assessment/Plan  Elevated PSA.  He has elected to proceed with MRI fusion prostate biopsies.  All risks, benefits and alternatives to the above mentioned procedure were discussed and the patient is willing to proceed at this time.      WINSOME BAZAN, DO

## 2024-02-09 ENCOUNTER — OFFICE VISIT (OUTPATIENT)
Dept: UROLOGY | Age: 54
End: 2024-02-09
Payer: COMMERCIAL

## 2024-02-09 ENCOUNTER — TELEPHONE (OUTPATIENT)
Dept: UROLOGY | Age: 54
End: 2024-02-09

## 2024-02-09 DIAGNOSIS — C61 PROSTATE CANCER (HCC): Primary | ICD-10-CM

## 2024-02-09 DIAGNOSIS — C61 MALIGNANT NEOPLASM OF PROSTATE (HCC): ICD-10-CM

## 2024-02-09 PROCEDURE — 99214 OFFICE O/P EST MOD 30 MIN: CPT | Performed by: UROLOGY

## 2024-02-09 NOTE — PROGRESS NOTES
[facility]  [address] [place] [address]  [phone]    Gabe Ray  : 1970     HPI   53 y.o., male returns in follow up for an elevated PSA. PSA was 6.5 (9% free) on 23.  MRI on 23 showed a pirads 4 RM 1.1cm lesion. Fusion biopsies completed on 24.  Vol was 41g.  Path showed Katrina 4+3 in 4 areas (RLB, RB, RM, JORGE LUIS).  No prior abd surgery.  Owns farms and tends to horses.      Past Medical History:   Diagnosis Date    Depression with anxiety     Elevated PSA 2024    History of hypertension     no longer requires,    Kidney stone     Megaloblastic erythrocytes     Non-melanoma skin cancer      Past Surgical History:   Procedure Laterality Date    APPENDECTOMY      COLONOSCOPY      PROSTATE BIOPSY N/A 2024    PROSTATE BIOPSY FUSION performed by Chi Isaacs DO at [facility] MAIN OR     Current Outpatient Medications   Medication Sig Dispense Refill    vitamin D (ERGOCALCIFEROL) 1.25 MG (15966 UT) CAPS capsule Take 1 capsule by mouth once a week 12 capsule 1    escitalopram (LEXAPRO) 10 MG tablet Take 1 tablet by mouth nightly      zolpidem (AMBIEN) 10 MG tablet Take 1 tablet by mouth nightly as needed for Sleep for up to 180 days. Max Daily Amount: 10 mg 30 tablet 5    acetaminophen (TYLENOL) 500 MG tablet Take 2 tablets by mouth in the morning and at bedtime       No current facility-administered medications for this visit.     Allergies   Allergen Reactions    Penicillins Other (See Comments)     Per his mother    Ace Inhibitors Cough     Social History     Socioeconomic History    Marital status: Single     Spouse name: Not on file    Number of children: Not on file    Years of education: Not on file    Highest education level: Not on file   Occupational History    Not on file   Tobacco Use    Smoking status: Never    Smokeless tobacco: Never   Vaping Use    Vaping Use: Never used   Substance and Sexual Activity    Alcohol use: Yes

## 2024-02-09 NOTE — TELEPHONE ENCOUNTER
Procedures: Procedure(s):   PROSTATECTOMY LAPAROSCOPIC ROBOTIC/ POSSIBLE BILATERAL LYMPH NODE DISSECTION   Date: 3/28/2024   Time: 0730   Location: [facility] MAIN OR

## 2024-02-09 NOTE — TELEPHONE ENCOUNTER
----- Message from Chi Isaacs DO sent at 2/9/2024 12:18 PM EST -----  Regarding: surg  RALP  Not before mid March

## 2024-02-12 ENCOUNTER — TELEPHONE (OUTPATIENT)
Dept: UROLOGY | Age: 54
End: 2024-02-12

## 2024-02-12 DIAGNOSIS — C61 PROSTATE CANCER (HCC): Primary | ICD-10-CM

## 2024-02-12 NOTE — TELEPHONE ENCOUNTER
----- Message from Chi Isaacs DO sent at 2/12/2024  9:31 AM EST -----  Regarding: FW: Referral to Radiation Oncology  Please refer to Dr. Murillo or Dr. Martinez.  Thanks.  ----- Message -----  From: Chika Forde RN  Sent: 2/12/2024   9:18 AM EST  To: Chi Isaacs DO  Subject: FW: Referral to Radiation Oncology                 ----- Message -----  From: Shannan Espinoza MA  Sent: 2/9/2024   6:12 PM EST  To: Chika Forde RN  Subject: Referral to Radiation Oncology                   Pt called on the afternoon of 2/9/24 stating that he would like a referral to Radiation Oncology.  Dr Isaacs Pt.

## 2024-02-14 DIAGNOSIS — C61 MALIGNANT NEOPLASM OF PROSTATE (HCC): Primary | ICD-10-CM

## 2024-02-29 ENCOUNTER — TELEMEDICINE (OUTPATIENT)
Dept: FAMILY MEDICINE CLINIC | Facility: CLINIC | Age: 54
End: 2024-02-29
Payer: COMMERCIAL

## 2024-02-29 VITALS
BODY MASS INDEX: 28.56 KG/M2 | HEART RATE: 84 BPM | HEIGHT: 71 IN | SYSTOLIC BLOOD PRESSURE: 148 MMHG | TEMPERATURE: 96.8 F | WEIGHT: 204 LBS | DIASTOLIC BLOOD PRESSURE: 107 MMHG

## 2024-02-29 DIAGNOSIS — C61 MALIGNANT NEOPLASM OF PROSTATE (HCC): ICD-10-CM

## 2024-02-29 DIAGNOSIS — J02.9 SORE THROAT: ICD-10-CM

## 2024-02-29 DIAGNOSIS — R68.89 FLU-LIKE SYMPTOMS: Primary | ICD-10-CM

## 2024-02-29 PROCEDURE — 3080F DIAST BP >= 90 MM HG: CPT | Performed by: FAMILY MEDICINE

## 2024-02-29 PROCEDURE — 3077F SYST BP >= 140 MM HG: CPT | Performed by: FAMILY MEDICINE

## 2024-02-29 PROCEDURE — 99214 OFFICE O/P EST MOD 30 MIN: CPT | Performed by: FAMILY MEDICINE

## 2024-02-29 ASSESSMENT — ENCOUNTER SYMPTOMS
NAUSEA: 0
SORE THROAT: 1
COUGH: 0
SINUS PAIN: 1
CHEST TIGHTNESS: 0
BLOOD IN STOOL: 0
DIARRHEA: 0
ABDOMINAL PAIN: 0
SHORTNESS OF BREATH: 0

## 2024-02-29 NOTE — PROGRESS NOTES
[facility]  _______________________________________  MD Piedad Ivan, ROSELINE Denise, MD Alea Willis MD    [address] [place] [id number]  Phone: [phone]  Fax: [phone]    Gabe Ray (:  1970) is a 53 y.o. male,Established patient, here for evaluation of the following chief complaint(s):  Fever (Pt symptoms started on Monday and everything else kicked in on Tuesday-Fever, bodyaches , and sore throat - Has not been tested /*Pt will be at parking lot for VV appt /)         ASSESSMENT/PLAN:    1. Flu-like symptoms  Flu/Strep/COVID neg.   Likely viral illness of other origin, he should continue conservative treatment and can return to work when he's 24H fever free.   - AMB POC RAPID INFLUENZA TEST  - AMB POC COVID-19 COV  - AMB POC RAPID STREP A    2. Sore throat  Treat with OTC cold meds.   - AMB POC RAPID STREP A    3. Malignant neoplasm of prostate (HCC)  I advised him to FU with his specialists and to consider his options. There is no clear cut black and white treatment algorithm for prostate cancer. I will follow along with his dispo.     FU 3m as planned      Subjective   SUBJECTIVE/OBJECTIVE:    Here to see what's going on, recent dx of prostate cancer, ave 7 in 4 cores of a saturation biopsy, he is trying to decide which route of treatment to take.     States symptoms evolved as above, started with sore throat, body aches, and fever. Fever seemed to break last night after taking motrin last night, but fever has not come back. Throat is no longer sore. He still has tonsils.     No N/V/D, no ear pain.     Last bout of COVID was 2-3 years ago.     He did have a flu shot this year.     Review of Systems   Constitutional:  Positive for chills, fatigue and fever.   HENT:  Positive for sinus pain and sore throat.    Respiratory:  Negative for cough, chest tightness and shortness of breath.    Gastrointestinal:

## 2024-03-07 ENCOUNTER — RESEARCH ENCOUNTER (OUTPATIENT)
Dept: RESEARCH | Age: 54
End: 2024-03-07

## 2024-03-07 ENCOUNTER — HOSPITAL ENCOUNTER (OUTPATIENT)
Dept: RADIATION ONCOLOGY | Age: 54
Setting detail: RECURRING SERIES
Discharge: HOME | End: 2024-03-10
Payer: COMMERCIAL

## 2024-03-07 VITALS
RESPIRATION RATE: 18 BRPM | HEART RATE: 80 BPM | OXYGEN SATURATION: 98 % | WEIGHT: 201.3 LBS | SYSTOLIC BLOOD PRESSURE: 132 MMHG | DIASTOLIC BLOOD PRESSURE: 91 MMHG | TEMPERATURE: 97.8 F | BODY MASS INDEX: 28.08 KG/M2

## 2024-03-07 PROCEDURE — 99211 OFF/OP EST MAY X REQ PHY/QHP: CPT

## 2024-03-07 RX ORDER — ASCORBIC ACID 500 MG
2000 TABLET ORAL 2 TIMES DAILY
COMMUNITY

## 2024-03-07 NOTE — PROGRESS NOTES
Consult for Prostate Cancer:    Patient arrives today accompanied by his sister  Patient reports no history of Collagen Vascular Disease  Patient reports no pacemaker or other chest implant  Patient reports no previous RT  Patient c/o pain 0/10  AUA= 9  Colonoscopy: OCT 2023, 1 small polyp no other issues  RADIATION TEACHING PROVIDED  Bowel Prep Sheet Provided and covered with Patient and sister  Frequently Asked Question Sheet Provided  Vitamin Sheet Provided and discussed  Opportunity for questions and clarifications provided  Patient and Family will discuss their options and let us know of their decision    
BIOPSY\":              ADENOCARCINOMA, KATRINA GRADE 4 + 3 = 7 (GRADE GROUP 3),   INVOLVING                  30% OF THE TISSUE.          K:  \"PROSTATE, RIGHT MID, BIOPSY\":              ADENOCARCINOMA, KATRINA GRADE 4 + 3 = 7 (GRADE GROUP 3),   INVOLVING                  30% OF THE TISSUE.          L:  \"PROSTATE, RIGHT APEX, BIOPSY\":              BENIGN PROSTATE TISSUE.          M:  \"PROSTATE, REGION OF INTEREST -1 RIGHT MID, BIOPSY\":             ADENOCARCINOMA, KATRINA GRADE 4 + 3 = 7 (GRADE GROUP 3),   INVOLVING                  30% OF THE TISSUE.     LABORATORY:   Lab Results   Component Value Date/Time     10/31/2023 03:12 PM    K 4.6 10/31/2023 03:12 PM     10/31/2023 03:12 PM    CO2 28 10/31/2023 03:12 PM    BUN 20 10/31/2023 03:12 PM    GFRAA 113 12/16/2021 11:34 AM    GLOB 2.9 10/31/2023 03:12 PM    ALT 23 10/31/2023 03:12 PM     Lab Results   Component Value Date/Time    WBC 5.5 10/31/2023 03:12 PM    HGB 13.8 10/31/2023 03:12 PM    HCT 41.5 10/31/2023 03:12 PM     10/31/2023 03:12 PM       RADIOLOGY:    I personally reviewed the images and agree with the findings as documented in the HPI.    MRI prostate (12/25/2023)  Lesion 1: PI-RADS 4, right mid peripheral zone, 1.1 cm.  BPH    IMPRESSION:  Gabe Ray is a 53 y.o. male with Stage IIB cT1 cN0 M0 unfavorable intermediate risk prostatic adenocarcinoma [place] score 4+3=7, initial PSA 6.54.      I had a long discussion with Gabe Ray regarding his diagnosis and treatment options including active surveillance, surgery, radiation therapy, and ADT. Based on his Katrina score, PSA, and clinical stage he has unfavorable intermediate risk disease. I recommended either CT + bone scan or PSMA PET/CT staging as per NCCN guidelines (but would defer this to Dr. Isaacs if ultimately opts for surgery). Given his unfavorable intermediate risk disease and anticipated life expectancy of >20 years based on social security actuarial

## 2024-03-08 NOTE — RESEARCH
Met with potential research subject for the  Trial.  Seen by Dr Martinez, ECOG 0.  Is here today to gather more information on radiation vs surgery.  Dr. Martinez educated in great details options for his prostate cancer, radiation, ADT treatment, side effect of Lupron radiation and surgery.  Talked about options for Decipher tissue testing with or without research and its indications.  Discussed in detail radiation in general and the schema for the NR- G010 trial of which he appears eligible.   Research coordinator reviewed with him and his sister the schema of the trial and its required procedures.  He states if his decision is not to have a prostatectomy, he would like to proceed with the G010 trial and aware of all its cohorts.  Consent given for review and will be seeing his urologist and PCP for one more follow up conversation.    At this time, we talked about the DCP-001 trial and he wished to participate.  Gave him consent version 01/30/23; implemented date 10/10/23 of which he read and signed.   Copy to him, one to medical records and the original to a research source chart for DCP-001 participants.  Gave him this coordinators phone and e-mail access information.  Will follow up with him in 1 week.

## 2024-03-22 ENCOUNTER — OFFICE VISIT (OUTPATIENT)
Dept: UROLOGY | Age: 54
End: 2024-03-22
Payer: COMMERCIAL

## 2024-03-22 DIAGNOSIS — C61 PROSTATE CANCER (HCC): Primary | ICD-10-CM

## 2024-03-22 PROCEDURE — 99214 OFFICE O/P EST MOD 30 MIN: CPT | Performed by: UROLOGY

## 2024-03-22 NOTE — PROGRESS NOTES
[facility]  [address] [place] [address]  [phone]    Gabe Ray  : 1970     HPI   53 y.o., male returns in follow up for CaP.  PSA was 6.5 (9% free) on 23.  MRI on 23 showed a pirads 4 RM 1.1cm lesion. Fusion biopsies completed on 24.  Vol was 41g.  Path showed Katrina 4+3 in 4 areas (RLB, RB, RM, JORGE LUIS).  No prior abd surgery.  Owns farms and tends to horses.   He has met with radiation oncology and returns to discuss tx options.      Past Medical History:   Diagnosis Date    Depression with anxiety     Elevated PSA 2024    History of hypertension     no longer requires,    Kidney stone     Megaloblastic erythrocytes     Non-melanoma skin cancer      Past Surgical History:   Procedure Laterality Date    APPENDECTOMY      COLONOSCOPY      PROSTATE BIOPSY N/A 2024    PROSTATE BIOPSY FUSION performed by Chi Isaacs DO at [facility] MAIN OR     Current Outpatient Medications   Medication Sig Dispense Refill    vitamin C (ASCORBIC ACID) 500 MG tablet Take 4 tablets by mouth 2 times daily      Turmeric (QC TUMERIC COMPLEX PO) Take 1 capsule by mouth daily      vitamin D (ERGOCALCIFEROL) 1.25 MG (14742 UT) CAPS capsule Take 1 capsule by mouth once a week 12 capsule 1    escitalopram (LEXAPRO) 10 MG tablet Take 1 tablet by mouth nightly      zolpidem (AMBIEN) 10 MG tablet Take 1 tablet by mouth nightly as needed for Sleep for up to 180 days. Max Daily Amount: 10 mg 30 tablet 5    acetaminophen (TYLENOL) 500 MG tablet Take 2 tablets by mouth in the morning and at bedtime       No current facility-administered medications for this visit.     Allergies   Allergen Reactions    Penicillins Other (See Comments)     Per his mother    Ace Inhibitors Cough     Social History     Socioeconomic History    Marital status: Single     Spouse name: Not on file    Number of children: Not on file    Years of education: Not on file    Highest education level:

## 2024-03-25 ENCOUNTER — HOSPITAL ENCOUNTER (OUTPATIENT)
Dept: GENERAL RADIOLOGY | Age: 54
Discharge: HOME | End: 2024-03-28
Payer: COMMERCIAL

## 2024-03-25 ENCOUNTER — HOSPITAL ENCOUNTER (OUTPATIENT)
Dept: SURGERY | Age: 54
Discharge: HOME | End: 2024-03-28
Payer: COMMERCIAL

## 2024-03-25 VITALS
HEIGHT: 70 IN | WEIGHT: 203.4 LBS | BODY MASS INDEX: 29.12 KG/M2 | RESPIRATION RATE: 17 BRPM | OXYGEN SATURATION: 96 % | HEART RATE: 75 BPM | TEMPERATURE: 98.4 F | DIASTOLIC BLOOD PRESSURE: 90 MMHG | SYSTOLIC BLOOD PRESSURE: 132 MMHG

## 2024-03-25 DIAGNOSIS — C61 PROSTATE CANCER (HCC): ICD-10-CM

## 2024-03-25 LAB
ANION GAP SERPL CALC-SCNC: 5 MMOL/L (ref 2–11)
APPEARANCE UR: CLEAR
APTT PPP: 29 SEC (ref 23.3–37.4)
BILIRUB UR QL: NEGATIVE
BUN SERPL-MCNC: 20 MG/DL (ref 6–23)
CALCIUM SERPL-MCNC: 9.2 MG/DL (ref 8.3–10.4)
CHLORIDE SERPL-SCNC: 108 MMOL/L (ref 103–113)
CO2 SERPL-SCNC: 26 MMOL/L (ref 21–32)
COLOR UR: ABNORMAL
CREAT SERPL-MCNC: 0.93 MG/DL (ref 0.8–1.5)
EKG ATRIAL RATE: 73 BPM
EKG DIAGNOSIS: NORMAL
EKG P AXIS: 55 DEGREES
EKG P-R INTERVAL: 186 MS
EKG Q-T INTERVAL: 368 MS
EKG QRS DURATION: 86 MS
EKG QTC CALCULATION (BAZETT): 405 MS
EKG R AXIS: -15 DEGREES
EKG T AXIS: 42 DEGREES
EKG VENTRICULAR RATE: 73 BPM
ERYTHROCYTE [DISTWIDTH] IN BLOOD BY AUTOMATED COUNT: 13 % (ref 11.9–14.6)
GLUCOSE SERPL-MCNC: 105 MG/DL (ref 65–100)
GLUCOSE UR STRIP.AUTO-MCNC: NEGATIVE MG/DL
HCT VFR BLD AUTO: 42.1 % (ref 41.1–50.3)
HGB BLD-MCNC: 14 G/DL (ref 13.6–17.2)
HGB UR QL STRIP: NEGATIVE
INR PPP: 1
KETONES UR QL STRIP.AUTO: NEGATIVE MG/DL
LEUKOCYTE ESTERASE UR QL STRIP.AUTO: NEGATIVE
MCH RBC QN AUTO: 30.6 PG (ref 26.1–32.9)
MCHC RBC AUTO-ENTMCNC: 33.3 G/DL (ref 31.4–35)
MCV RBC AUTO: 91.9 FL (ref 82–102)
NITRITE UR QL STRIP.AUTO: NEGATIVE
NRBC # BLD: 0 K/UL (ref 0–0.2)
PH UR STRIP: 6 (ref 5–9)
PLATELET # BLD AUTO: 354 K/UL (ref 150–450)
PMV BLD AUTO: 10.2 FL (ref 9.4–12.3)
POTASSIUM SERPL-SCNC: 4.1 MMOL/L (ref 3.5–5.1)
PROT UR STRIP-MCNC: NEGATIVE MG/DL
PROTHROMBIN TIME: 12.3 SEC (ref 11.3–14.9)
RBC # BLD AUTO: 4.58 M/UL (ref 4.23–5.6)
SODIUM SERPL-SCNC: 139 MMOL/L (ref 136–146)
SP GR UR REFRACTOMETRY: 1.02 (ref 1–1.02)
UROBILINOGEN UR QL STRIP.AUTO: 0.2 EU/DL (ref 0.2–1)
WBC # BLD AUTO: 5.9 K/UL (ref 4.3–11.1)

## 2024-03-25 PROCEDURE — 85610 PROTHROMBIN TIME: CPT

## 2024-03-25 PROCEDURE — 80048 BASIC METABOLIC PNL TOTAL CA: CPT

## 2024-03-25 PROCEDURE — 85027 COMPLETE CBC AUTOMATED: CPT

## 2024-03-25 PROCEDURE — 93010 ELECTROCARDIOGRAM REPORT: CPT | Performed by: INTERNAL MEDICINE

## 2024-03-25 PROCEDURE — 81003 URINALYSIS AUTO W/O SCOPE: CPT

## 2024-03-25 PROCEDURE — 87086 URINE CULTURE/COLONY COUNT: CPT

## 2024-03-25 PROCEDURE — 71046 X-RAY EXAM CHEST 2 VIEWS: CPT

## 2024-03-25 PROCEDURE — 85730 THROMBOPLASTIN TIME PARTIAL: CPT

## 2024-03-25 PROCEDURE — 93005 ELECTROCARDIOGRAM TRACING: CPT

## 2024-03-25 NOTE — PERIOP NOTE
Patient verified name and     Order for consent was found in EHR and matches case posting; patient verified.     Type III surgery, walk-in assessment complete.    Labs per surgeon: CBC, BMP, APTT, PT/INR, UA, Urine culture, T&S (DOS), CXR; results pending  Labs per anesthesia protocol: no additional   EKG: done today [facility] approved surgical skin cleanser and instructions given per hospital policy.    Patient provided with and instructed on educational handouts including Guide to Surgery, Pain Management, Hand Hygiene, Blood Transfusion Education, and [facility] Brochure.    Patient answered medical/surgical history questions at their best of ability. All prior to admission medications documented in [facility]. Original medication prescription bottle not visualized during patient appointment.     Patient instructed to hold all vitamins 7 days prior to surgery and NSAIDS 5 days prior to surgery, patient verbalized understanding.     Patient teach back successful and patient demonstrates knowledge of instructions.

## 2024-03-25 NOTE — PERIOP NOTE
Labs  within anesthesia guidelines, no follow-up required. Labs automatically routed to ordering provider via Epic documentation.

## 2024-03-25 NOTE — PERIOP NOTE
PLEASE CONTINUE TAKING ALL PRESCRIPTION MEDICATIONS UP TO THE DAY OF SURGERY UNLESS OTHERWISE DIRECTED BELOW. You may take Tylenol, allergy,  and/or indigestion medications.     TAKE ONLY THESE MEDICATIONS ON THE DAY OF SURGERY    none           DISCONTINUE all vitamins and supplements 7 days prior to surgery. DISCONTINUE Non-Steroidal Anti-Inflammatory (NSAIDS) such as Advil and Aleve 5 days prior to surgery.     Home Medications to Hold- please continue all other medications except these.    All vitamins and supplements         Comments      On the day before surgery please take 2 Tylenol in the morning and then again before bed. You may use either regular or extra strength.           Please do not bring home medications with you on the day of surgery unless otherwise directed by your nurse.  If you are instructed to bring home medications, please give them to your nurse as they will be administered by the nursing staff.    If you have any questions, please call [facility] [phone].    A copy of this note was provided to the patient for reference.

## 2024-03-27 ENCOUNTER — ANESTHESIA EVENT (OUTPATIENT)
Dept: SURGERY | Age: 54
End: 2024-03-27
Payer: COMMERCIAL

## 2024-03-27 LAB
BACTERIA SPEC CULT: NORMAL
SERVICE CMNT-IMP: NORMAL

## 2024-03-28 ENCOUNTER — ANESTHESIA (OUTPATIENT)
Dept: SURGERY | Age: 54
End: 2024-03-28
Payer: COMMERCIAL

## 2024-03-28 ENCOUNTER — HOSPITAL ENCOUNTER (INPATIENT)
Age: 54
LOS: 1 days | Discharge: HOME | DRG: 708 | End: 2024-03-29
Attending: UROLOGY | Admitting: UROLOGY
Payer: COMMERCIAL

## 2024-03-28 DIAGNOSIS — C61 PROSTATE CANCER (HCC): Primary | ICD-10-CM

## 2024-03-28 LAB
ABO + RH BLD: NORMAL
BLOOD GROUP ANTIBODIES SERPL: NORMAL
HCT VFR BLD AUTO: 42.2 % (ref 41.1–50.3)
HGB BLD-MCNC: 13.9 G/DL (ref 13.6–17.2)
SPECIMEN EXP DATE BLD: NORMAL

## 2024-03-28 PROCEDURE — 6360000002 HC RX W HCPCS: Performed by: UROLOGY

## 2024-03-28 PROCEDURE — 6360000002 HC RX W HCPCS: Performed by: REGISTERED NURSE

## 2024-03-28 PROCEDURE — 3600000009 HC SURGERY ROBOT BASE: Performed by: UROLOGY

## 2024-03-28 PROCEDURE — 86901 BLOOD TYPING SEROLOGIC RH(D): CPT

## 2024-03-28 PROCEDURE — 0VT04ZZ RESECTION OF PROSTATE, PERCUTANEOUS ENDOSCOPIC APPROACH: ICD-10-PCS | Performed by: UROLOGY

## 2024-03-28 PROCEDURE — 2580000003 HC RX 258: Performed by: REGISTERED NURSE

## 2024-03-28 PROCEDURE — 07BC4ZZ EXCISION OF PELVIS LYMPHATIC, PERCUTANEOUS ENDOSCOPIC APPROACH: ICD-10-PCS | Performed by: UROLOGY

## 2024-03-28 PROCEDURE — 2500000003 HC RX 250 WO HCPCS: Performed by: REGISTERED NURSE

## 2024-03-28 PROCEDURE — 88305 TISSUE EXAM BY PATHOLOGIST: CPT

## 2024-03-28 PROCEDURE — 3700000001 HC ADD 15 MINUTES (ANESTHESIA): Performed by: UROLOGY

## 2024-03-28 PROCEDURE — 6370000000 HC RX 637 (ALT 250 FOR IP): Performed by: UROLOGY

## 2024-03-28 PROCEDURE — 88309 TISSUE EXAM BY PATHOLOGIST: CPT

## 2024-03-28 PROCEDURE — 7100000001 HC PACU RECOVERY - ADDTL 15 MIN: Performed by: UROLOGY

## 2024-03-28 PROCEDURE — 55866 LAPS SURG PRST8ECT RPBIC RAD: CPT | Performed by: UROLOGY

## 2024-03-28 PROCEDURE — 6360000002 HC RX W HCPCS: Performed by: ANESTHESIOLOGY

## 2024-03-28 PROCEDURE — 86850 RBC ANTIBODY SCREEN: CPT

## 2024-03-28 PROCEDURE — 85014 HEMATOCRIT: CPT

## 2024-03-28 PROCEDURE — 2580000003 HC RX 258: Performed by: UROLOGY

## 2024-03-28 PROCEDURE — 3700000000 HC ANESTHESIA ATTENDED CARE: Performed by: UROLOGY

## 2024-03-28 PROCEDURE — 2580000003 HC RX 258: Performed by: ANESTHESIOLOGY

## 2024-03-28 PROCEDURE — S2900 ROBOTIC SURGICAL SYSTEM: HCPCS | Performed by: UROLOGY

## 2024-03-28 PROCEDURE — G0378 HOSPITAL OBSERVATION PER HR: HCPCS

## 2024-03-28 PROCEDURE — 3600000019 HC SURGERY ROBOT ADDTL 15MIN: Performed by: UROLOGY

## 2024-03-28 PROCEDURE — 6370000000 HC RX 637 (ALT 250 FOR IP): Performed by: ANESTHESIOLOGY

## 2024-03-28 PROCEDURE — 7100000000 HC PACU RECOVERY - FIRST 15 MIN: Performed by: UROLOGY

## 2024-03-28 PROCEDURE — 38571 LAPAROSCOPY LYMPHADENECTOMY: CPT | Performed by: UROLOGY

## 2024-03-28 PROCEDURE — 86900 BLOOD TYPING SEROLOGIC ABO: CPT

## 2024-03-28 PROCEDURE — 8E0W4CZ ROBOTIC ASSISTED PROCEDURE OF TRUNK REGION, PERCUTANEOUS ENDOSCOPIC APPROACH: ICD-10-PCS | Performed by: UROLOGY

## 2024-03-28 PROCEDURE — 85018 HEMOGLOBIN: CPT

## 2024-03-28 PROCEDURE — 2709999900 HC NON-CHARGEABLE SUPPLY: Performed by: UROLOGY

## 2024-03-28 RX ORDER — SODIUM CHLORIDE 0.9 % (FLUSH) 0.9 %
5-40 SYRINGE (ML) INJECTION PRN
Status: DISCONTINUED | OUTPATIENT
Start: 2024-03-28 | End: 2024-03-28 | Stop reason: HOSPADM

## 2024-03-28 RX ORDER — OXYBUTYNIN CHLORIDE 5 MG/1
5 TABLET ORAL 3 TIMES DAILY PRN
Status: DISCONTINUED | OUTPATIENT
Start: 2024-03-28 | End: 2024-03-29 | Stop reason: HOSPADM

## 2024-03-28 RX ORDER — OXYCODONE HYDROCHLORIDE 5 MG/1
5 TABLET ORAL
Status: DISCONTINUED | OUTPATIENT
Start: 2024-03-28 | End: 2024-03-28 | Stop reason: HOSPADM

## 2024-03-28 RX ORDER — ACETAMINOPHEN 500 MG
1000 TABLET ORAL ONCE
Status: COMPLETED | OUTPATIENT
Start: 2024-03-28 | End: 2024-03-28

## 2024-03-28 RX ORDER — SODIUM CHLORIDE, SODIUM LACTATE, POTASSIUM CHLORIDE, CALCIUM CHLORIDE 600; 310; 30; 20 MG/100ML; MG/100ML; MG/100ML; MG/100ML
INJECTION, SOLUTION INTRAVENOUS CONTINUOUS
Status: DISCONTINUED | OUTPATIENT
Start: 2024-03-28 | End: 2024-03-28 | Stop reason: HOSPADM

## 2024-03-28 RX ORDER — NALOXONE HYDROCHLORIDE 0.4 MG/ML
INJECTION, SOLUTION INTRAMUSCULAR; INTRAVENOUS; SUBCUTANEOUS PRN
Status: DISCONTINUED | OUTPATIENT
Start: 2024-03-28 | End: 2024-03-28 | Stop reason: HOSPADM

## 2024-03-28 RX ORDER — FENTANYL CITRATE 50 UG/ML
50 INJECTION, SOLUTION INTRAMUSCULAR; INTRAVENOUS EVERY 5 MIN PRN
Status: DISCONTINUED | OUTPATIENT
Start: 2024-03-28 | End: 2024-03-28 | Stop reason: HOSPADM

## 2024-03-28 RX ORDER — PROPOFOL 10 MG/ML
INJECTION, EMULSION INTRAVENOUS PRN
Status: DISCONTINUED | OUTPATIENT
Start: 2024-03-28 | End: 2024-03-28 | Stop reason: SDUPTHER

## 2024-03-28 RX ORDER — ONDANSETRON 2 MG/ML
4 INJECTION INTRAMUSCULAR; INTRAVENOUS
Status: DISCONTINUED | OUTPATIENT
Start: 2024-03-28 | End: 2024-03-28 | Stop reason: HOSPADM

## 2024-03-28 RX ORDER — KETAMINE HYDROCHLORIDE 50 MG/ML
INJECTION, SOLUTION INTRAMUSCULAR; INTRAVENOUS PRN
Status: DISCONTINUED | OUTPATIENT
Start: 2024-03-28 | End: 2024-03-28 | Stop reason: SDUPTHER

## 2024-03-28 RX ORDER — LIDOCAINE HYDROCHLORIDE 10 MG/ML
1 INJECTION, SOLUTION INFILTRATION; PERINEURAL
Status: DISCONTINUED | OUTPATIENT
Start: 2024-03-28 | End: 2024-03-28 | Stop reason: HOSPADM

## 2024-03-28 RX ORDER — HYDROCODONE BITARTRATE AND ACETAMINOPHEN 5; 325 MG/1; MG/1
1 TABLET ORAL EVERY 4 HOURS PRN
Status: DISCONTINUED | OUTPATIENT
Start: 2024-03-28 | End: 2024-03-29 | Stop reason: HOSPADM

## 2024-03-28 RX ORDER — MORPHINE SULFATE 2 MG/ML
2 INJECTION, SOLUTION INTRAMUSCULAR; INTRAVENOUS
Status: DISCONTINUED | OUTPATIENT
Start: 2024-03-28 | End: 2024-03-29 | Stop reason: HOSPADM

## 2024-03-28 RX ORDER — LIDOCAINE HYDROCHLORIDE 20 MG/ML
INJECTION, SOLUTION EPIDURAL; INFILTRATION; INTRACAUDAL; PERINEURAL PRN
Status: DISCONTINUED | OUTPATIENT
Start: 2024-03-28 | End: 2024-03-28 | Stop reason: SDUPTHER

## 2024-03-28 RX ORDER — DEXAMETHASONE SODIUM PHOSPHATE 10 MG/ML
INJECTION INTRAMUSCULAR; INTRAVENOUS PRN
Status: DISCONTINUED | OUTPATIENT
Start: 2024-03-28 | End: 2024-03-28 | Stop reason: SDUPTHER

## 2024-03-28 RX ORDER — IPRATROPIUM BROMIDE AND ALBUTEROL SULFATE 2.5; .5 MG/3ML; MG/3ML
1 SOLUTION RESPIRATORY (INHALATION)
Status: DISCONTINUED | OUTPATIENT
Start: 2024-03-28 | End: 2024-03-28 | Stop reason: HOSPADM

## 2024-03-28 RX ORDER — HYDROMORPHONE HYDROCHLORIDE 2 MG/ML
0.5 INJECTION, SOLUTION INTRAMUSCULAR; INTRAVENOUS; SUBCUTANEOUS EVERY 10 MIN PRN
Status: DISCONTINUED | OUTPATIENT
Start: 2024-03-28 | End: 2024-03-28 | Stop reason: HOSPADM

## 2024-03-28 RX ORDER — MIDAZOLAM HYDROCHLORIDE 2 MG/2ML
2 INJECTION, SOLUTION INTRAMUSCULAR; INTRAVENOUS
Status: DISCONTINUED | OUTPATIENT
Start: 2024-03-28 | End: 2024-03-28 | Stop reason: HOSPADM

## 2024-03-28 RX ORDER — BUPIVACAINE HYDROCHLORIDE 2.5 MG/ML
INJECTION, SOLUTION EPIDURAL; INFILTRATION; INTRACAUDAL PRN
Status: DISCONTINUED | OUTPATIENT
Start: 2024-03-28 | End: 2024-03-28 | Stop reason: HOSPADM

## 2024-03-28 RX ORDER — HALOPERIDOL 5 MG/ML
1 INJECTION INTRAMUSCULAR
Status: DISCONTINUED | OUTPATIENT
Start: 2024-03-28 | End: 2024-03-28 | Stop reason: HOSPADM

## 2024-03-28 RX ORDER — ROCURONIUM BROMIDE 10 MG/ML
INJECTION, SOLUTION INTRAVENOUS PRN
Status: DISCONTINUED | OUTPATIENT
Start: 2024-03-28 | End: 2024-03-28 | Stop reason: SDUPTHER

## 2024-03-28 RX ORDER — FENTANYL CITRATE 50 UG/ML
INJECTION, SOLUTION INTRAMUSCULAR; INTRAVENOUS PRN
Status: DISCONTINUED | OUTPATIENT
Start: 2024-03-28 | End: 2024-03-28 | Stop reason: SDUPTHER

## 2024-03-28 RX ORDER — ONDANSETRON 2 MG/ML
4 INJECTION INTRAMUSCULAR; INTRAVENOUS EVERY 6 HOURS PRN
Status: DISCONTINUED | OUTPATIENT
Start: 2024-03-28 | End: 2024-03-29 | Stop reason: HOSPADM

## 2024-03-28 RX ORDER — MIDAZOLAM HYDROCHLORIDE 1 MG/ML
INJECTION INTRAMUSCULAR; INTRAVENOUS PRN
Status: DISCONTINUED | OUTPATIENT
Start: 2024-03-28 | End: 2024-03-28 | Stop reason: SDUPTHER

## 2024-03-28 RX ORDER — DEXTROSE AND SODIUM CHLORIDE 5; .45 G/100ML; G/100ML
INJECTION, SOLUTION INTRAVENOUS CONTINUOUS
Status: DISCONTINUED | OUTPATIENT
Start: 2024-03-28 | End: 2024-03-29 | Stop reason: HOSPADM

## 2024-03-28 RX ORDER — DOCUSATE SODIUM 100 MG/1
100 CAPSULE, LIQUID FILLED ORAL 2 TIMES DAILY
Status: DISCONTINUED | OUTPATIENT
Start: 2024-03-28 | End: 2024-03-29 | Stop reason: HOSPADM

## 2024-03-28 RX ORDER — SODIUM CHLORIDE 0.9 % (FLUSH) 0.9 %
5-40 SYRINGE (ML) INJECTION EVERY 12 HOURS SCHEDULED
Status: DISCONTINUED | OUTPATIENT
Start: 2024-03-28 | End: 2024-03-28 | Stop reason: HOSPADM

## 2024-03-28 RX ORDER — ACETAMINOPHEN 325 MG/1
650 TABLET ORAL EVERY 4 HOURS PRN
Status: DISCONTINUED | OUTPATIENT
Start: 2024-03-28 | End: 2024-03-29 | Stop reason: HOSPADM

## 2024-03-28 RX ORDER — ONDANSETRON 2 MG/ML
INJECTION INTRAMUSCULAR; INTRAVENOUS PRN
Status: DISCONTINUED | OUTPATIENT
Start: 2024-03-28 | End: 2024-03-28 | Stop reason: SDUPTHER

## 2024-03-28 RX ADMIN — ACETAMINOPHEN 1000 MG: 500 TABLET, FILM COATED ORAL at 05:56

## 2024-03-28 RX ADMIN — DOCUSATE SODIUM 100 MG: 100 CAPSULE, LIQUID FILLED ORAL at 21:02

## 2024-03-28 RX ADMIN — PHENYLEPHRINE HYDROCHLORIDE 100 MCG: 10 INJECTION INTRAVENOUS at 07:26

## 2024-03-28 RX ADMIN — SODIUM CHLORIDE, POTASSIUM CHLORIDE, SODIUM LACTATE AND CALCIUM CHLORIDE: 600; 310; 30; 20 INJECTION, SOLUTION INTRAVENOUS at 05:57

## 2024-03-28 RX ADMIN — DEXTROSE AND SODIUM CHLORIDE: 5; 450 INJECTION, SOLUTION INTRAVENOUS at 23:05

## 2024-03-28 RX ADMIN — KETAMINE HYDROCHLORIDE 20 MG: 50 INJECTION, SOLUTION INTRAMUSCULAR; INTRAVENOUS at 07:17

## 2024-03-28 RX ADMIN — LIDOCAINE HYDROCHLORIDE 100 MG: 20 INJECTION, SOLUTION EPIDURAL; INFILTRATION; INTRACAUDAL; PERINEURAL at 07:17

## 2024-03-28 RX ADMIN — CEFAZOLIN 1000 MG: 1 INJECTION, POWDER, FOR SOLUTION INTRAMUSCULAR; INTRAVENOUS at 14:52

## 2024-03-28 RX ADMIN — HYDROCODONE BITARTRATE AND ACETAMINOPHEN 1 TABLET: 5; 325 TABLET ORAL at 14:21

## 2024-03-28 RX ADMIN — FENTANYL CITRATE 50 MCG: 50 INJECTION, SOLUTION INTRAMUSCULAR; INTRAVENOUS at 07:17

## 2024-03-28 RX ADMIN — ACETAMINOPHEN 650 MG: 325 TABLET ORAL at 21:06

## 2024-03-28 RX ADMIN — MIDAZOLAM 2 MG: 1 INJECTION INTRAMUSCULAR; INTRAVENOUS at 07:10

## 2024-03-28 RX ADMIN — MORPHINE SULFATE 2 MG: 2 INJECTION, SOLUTION INTRAMUSCULAR; INTRAVENOUS at 11:40

## 2024-03-28 RX ADMIN — DOCUSATE SODIUM 100 MG: 100 CAPSULE, LIQUID FILLED ORAL at 11:40

## 2024-03-28 RX ADMIN — PHENYLEPHRINE HYDROCHLORIDE 100 MCG: 10 INJECTION INTRAVENOUS at 07:30

## 2024-03-28 RX ADMIN — ONDANSETRON 4 MG: 2 INJECTION INTRAMUSCULAR; INTRAVENOUS at 07:39

## 2024-03-28 RX ADMIN — Medication 2000 MG: at 07:10

## 2024-03-28 RX ADMIN — ROCURONIUM BROMIDE 40 MG: 10 INJECTION, SOLUTION INTRAVENOUS at 07:17

## 2024-03-28 RX ADMIN — FENTANYL CITRATE 50 MCG: 50 INJECTION, SOLUTION INTRAMUSCULAR; INTRAVENOUS at 07:41

## 2024-03-28 RX ADMIN — ROCURONIUM BROMIDE 10 MG: 10 INJECTION, SOLUTION INTRAVENOUS at 07:45

## 2024-03-28 RX ADMIN — DEXAMETHASONE SODIUM PHOSPHATE 10 MG: 10 INJECTION INTRAMUSCULAR; INTRAVENOUS at 07:39

## 2024-03-28 RX ADMIN — DEXTROSE AND SODIUM CHLORIDE: 5; 450 INJECTION, SOLUTION INTRAVENOUS at 17:53

## 2024-03-28 RX ADMIN — PROPOFOL 200 MG: 10 INJECTION, EMULSION INTRAVENOUS at 07:17

## 2024-03-28 RX ADMIN — HYDROMORPHONE HYDROCHLORIDE 0.5 MG: 2 INJECTION, SOLUTION INTRAMUSCULAR; INTRAVENOUS; SUBCUTANEOUS at 09:47

## 2024-03-28 RX ADMIN — KETAMINE HYDROCHLORIDE 10 MG: 50 INJECTION, SOLUTION INTRAMUSCULAR; INTRAVENOUS at 07:44

## 2024-03-28 RX ADMIN — HYDROCODONE BITARTRATE AND ACETAMINOPHEN 1 TABLET: 5; 325 TABLET ORAL at 18:04

## 2024-03-28 RX ADMIN — KETAMINE HYDROCHLORIDE 10 MG: 50 INJECTION, SOLUTION INTRAMUSCULAR; INTRAVENOUS at 08:15

## 2024-03-28 RX ADMIN — DEXTROSE AND SODIUM CHLORIDE: 5; 450 INJECTION, SOLUTION INTRAVENOUS at 11:41

## 2024-03-28 RX ADMIN — HYDROCODONE BITARTRATE AND ACETAMINOPHEN 1 TABLET: 5; 325 TABLET ORAL at 22:58

## 2024-03-28 RX ADMIN — CEFAZOLIN 1000 MG: 1 INJECTION, POWDER, FOR SOLUTION INTRAMUSCULAR; INTRAVENOUS at 22:58

## 2024-03-28 RX ADMIN — SUGAMMADEX 200 MG: 100 INJECTION, SOLUTION INTRAVENOUS at 09:11

## 2024-03-28 ASSESSMENT — PAIN DESCRIPTION - LOCATION
LOCATION: ABDOMEN;GROIN
LOCATION: ABDOMEN

## 2024-03-28 ASSESSMENT — PAIN DESCRIPTION - ORIENTATION
ORIENTATION: ANTERIOR
ORIENTATION: RIGHT;MID
ORIENTATION: ANTERIOR
ORIENTATION: MID;ANTERIOR;RIGHT;LEFT
ORIENTATION: MID;RIGHT;LEFT
ORIENTATION: ANTERIOR

## 2024-03-28 ASSESSMENT — PAIN DESCRIPTION - DESCRIPTORS
DESCRIPTORS: ACHING;DISCOMFORT;SORE;SHARP
DESCRIPTORS: ACHING;DISCOMFORT;SORE
DESCRIPTORS: SHARP
DESCRIPTORS: ACHING;SORE
DESCRIPTORS: SHARP
DESCRIPTORS: SHARP

## 2024-03-28 ASSESSMENT — PAIN SCALES - GENERAL
PAINLEVEL_OUTOF10: 3
PAINLEVEL_OUTOF10: 7
PAINLEVEL_OUTOF10: 6
PAINLEVEL_OUTOF10: 2
PAINLEVEL_OUTOF10: 3
PAINLEVEL_OUTOF10: 5
PAINLEVEL_OUTOF10: 5
PAINLEVEL_OUTOF10: 2
PAINLEVEL_OUTOF10: 7
PAINLEVEL_OUTOF10: 3
PAINLEVEL_OUTOF10: 7
PAINLEVEL_OUTOF10: 7

## 2024-03-28 ASSESSMENT — PAIN - FUNCTIONAL ASSESSMENT
PAIN_FUNCTIONAL_ASSESSMENT: 0-10
PAIN_FUNCTIONAL_ASSESSMENT: ACTIVITIES ARE NOT PREVENTED
PAIN_FUNCTIONAL_ASSESSMENT: ACTIVITIES ARE NOT PREVENTED

## 2024-03-28 NOTE — ANESTHESIA PRE PROCEDURE
Department of Anesthesiology  Preprocedure Note       Name:  Gabe Ray   Age:  53 y.o.  :  1970                                          MRN:  [id number]         Date:  3/28/2024      Surgeon: Surgeon(s):  Chi Isaacs DO    Procedure: Procedure(s):  PROSTATECTOMY LAPAROSCOPIC ROBOTIC/ POSSIBLE BILATERAL LYMPH NODE DISSECTION    Medications prior to admission:   Prior to Admission medications    Medication Sig Start Date End Date Taking? Authorizing Provider   vitamin C (ASCORBIC ACID) 500 MG tablet Take 4 tablets by mouth 2 times daily    Jose Delvalle MD   Turmeric (QC TUMERIC COMPLEX PO) Take 1 capsule by mouth daily    Jose Delvalle MD   acetaminophen (TYLENOL) 500 MG tablet Take 2 tablets by mouth in the morning and at bedtime 24  Jose Delvalle MD   vitamin D (ERGOCALCIFEROL) 1.25 MG (81072 UT) CAPS capsule Take 1 capsule by mouth once a week  Patient taking differently: Take 1 capsule by mouth once a week Takes on 23   Arjun Robert MD   escitalopram (LEXAPRO) 10 MG tablet Take 1 tablet by mouth nightly  Patient not taking: Reported on 3/25/2024    Jose Delvalle MD   zolpidem (AMBIEN) 10 MG tablet Take 1 tablet by mouth nightly as needed for Sleep for up to 180 days. Max Daily Amount: 10 mg  Patient not taking: Reported on 3/25/2024 11/20/23 5/18/24  Arjun Robert MD       Current medications:    Current Facility-Administered Medications   Medication Dose Route Frequency Provider Last Rate Last Admin   • lidocaine 1 % injection 1 mL  1 mL IntraDERmal Once PRN Agustín Rodríguez MD       • famotidine (PEPCID) 20 mg in sodium chloride (PF) 0.9 % 10 mL injection  20 mg IntraVENous Once PRN Agustín Rodríguez MD       • lactated ringers IV soln infusion   IntraVENous Continuous Agustín Rodríguez  mL/hr at 24 0557 New Bag at 24 0557   • sodium chloride flush 0.9 % injection 5-40 mL  5-40 mL IntraVENous 2 times per

## 2024-03-28 NOTE — BRIEF OP NOTE
Brief Postoperative Note      Patient: Gabe Ray  YOB: 1970  MRN: [id number]    Date of Procedure: 3/28/2024    Pre-Op Diagnosis Codes:     * Malignant neoplasm of prostate (HCC) [C61]    Post-Op Diagnosis: Same    Procedure:  RALP and bilateral PLND    Surgeon(s):  Winsome Bazan DO    Assistant:  * No surgical staff found *    Anesthesia: General    Estimated Blood Loss (mL): 50cc    Complications: none immediate    Specimens:   ID Type Source Tests Collected by Time Destination   A : PROSTATE Tissue Prostate SURGICAL PATHOLOGY Winsome Bazan DO 3/28/2024 0801    B : LEFT PELVIC LYMPH NODE Tissue Lymph Node SURGICAL PATHOLOGY Winsome Bazan DO 3/28/2024 0803    C : RIGHT PELVIC LYMPH NODE Tissue Lymph Node SURGICAL PATHOLOGY Winsome Bazan DO 3/28/2024 0803        Implants:  * No implants in log *      Drains:   Closed/Suction Drain Lateral RUQ Bulb (Active)   Site Description Clean, dry & intact 03/28/24 0923   Dressing Status Clean, dry & intact 03/28/24 0923   Drainage Appearance Bloody 03/28/24 0923   Drain Status Compressed;To bulb suction 03/28/24 0923       Urinary Catheter 03/28/24 2 Way (Active)   Catheter Indications Perioperative use for selected surgical procedures 03/28/24 0923   Site Assessment No urethral drainage 03/28/24 0923   Urine Color Orange 03/28/24 0923   Urine Appearance Sediment 03/28/24 0923   Collection Container Standard 03/28/24 0923   Securement Method Securing device (Describe) 03/28/24 0923   Catheter Best Practices  Drainage tube clipped to bed;Catheter secured to thigh;Tamper seal intact;Bag below bladder;Bag not on floor;Lack of dependent loop in tubing;Drainage bag less than half full 03/28/24 0923   Status Draining;Patent 03/28/24 0923       Findings: see op note      Electronically signed by WINSOME BAZAN DO on 3/28/2024 at 9:29 AM

## 2024-03-28 NOTE — PROGRESS NOTES
Pt is in bed without complaints. Hourly rounds completed and all needs met. Pain managed per MAR. Pt has ambulated roughly 500ft and tolerated well. Polanco draining and 40mL drained from ALLISON drain since arrival to unit. Bed is low, locked, and call light is in reach. Pt encouraged to call for assistance.

## 2024-03-28 NOTE — PROGRESS NOTES
4 Eyes Skin Assessment     NAME:  Gabe Ray  YOB: 1970  MEDICAL RECORD NUMBER:  [id number]    The patient is being assessed for  Post-Op Surgical    I agree that at least one RN has performed a thorough Head to Toe Skin Assessment on the patient. ALL assessment sites listed below have been assessed.      Areas assessed by both nurses:    Head, Face, Ears, Shoulders, Back, Chest, Arms, Elbows, Hands, Sacrum. Buttock, Coccyx, Ischium, and Legs. Feet and Heels        Does the Patient have a Wound? No noted wound(s)       Bhavin Prevention initiated by RN: Yes  Wound Care Orders initiated by RN: No    Pressure Injury (Stage 3,4, Unstageable, DTI, NWPT, and Complex wounds) if present, place Wound referral order by RN under : No    New Ostomies, if present place, Ostomy referral order under : No     Nurse 1 eSignature: Electronically signed by BAL LY RN on 3/28/24 at 11:56 AM EDT    **SHARE this note so that the co-signing nurse can place an eSignature**    Nurse 2 eSignature: Electronically signed by Lety Hung RN on 3/28/24 at 11:56 AM EDT

## 2024-03-28 NOTE — ANESTHESIA POSTPROCEDURE EVALUATION
Department of Anesthesiology  Postprocedure Note    Patient: Gabe Ray  MRN: [id number]  YOB: 1970  Date of evaluation: 3/28/2024    Procedure Summary       Date: 03/28/24 Room / Location: [facility] MAIN OR  / [facility] MAIN OR    Anesthesia Start: 0707 Anesthesia Stop: 0925    Procedure: PROSTATECTOMY LAPAROSCOPIC ROBOTIC/ POSSIBLE BILATERAL LYMPH NODE DISSECTION (Abdomen) Diagnosis:       Malignant neoplasm of prostate (HCC)      (Malignant neoplasm of prostate (HCC) [C61])    Providers: Chi Isaacs DO Responsible Provider: Agustín Rodríguez MD    Anesthesia Type: general ASA Status: 3            Anesthesia Type: No value filed.    Ruthy Phase I: Ruthy Score: 8    Ruthy Phase II:      Anesthesia Post Evaluation    Patient location during evaluation: PACU  Patient participation: complete - patient participated  Level of consciousness: awake and alert  Airway patency: patent  Nausea & Vomiting: no nausea and no vomiting  Cardiovascular status: hemodynamically stable  Respiratory status: acceptable, nonlabored ventilation and spontaneous ventilation  Hydration status: euvolemic  Comments: /85   Pulse 79   Temp 97.9 °F (36.6 °C) (Temporal)   Resp 17   SpO2 97%     Multimodal analgesia pain management approach  Pain management: adequate and satisfactory to patient    No notable events documented.

## 2024-03-28 NOTE — PROGRESS NOTES
TRANSFER - IN REPORT:    Verbal report received from KOLBY Friedman on Gabe Ray  being received from PACU for routine progression of patient care      Report consisted of patient's Situation, Background, Assessment and   Recommendations(SBAR).     Information from the following report(s) Nurse Handoff Report was reviewed with the receiving nurse.    Opportunity for questions and clarification was provided.      Assessment to be completed upon patient's arrival to unit and then care will be assumed.

## 2024-03-28 NOTE — OP NOTE
[address]  [address]                            OPERATIVE REPORT      PATIENT NAME: MATTHIEU THACKER         : 1970  MED REC NO: [id number]                       ROOM: 607  ACCOUNT NO: [id number]                       ADMIT DATE: 2024  PROVIDER: Chi Isaacs DO    DATE OF SERVICE:  2024    PREOPERATIVE DIAGNOSES:  Prostate cancer.    POSTOPERATIVE DIAGNOSES:  Prostate cancer.    PROCEDURES PERFORMED:  Robotic-assisted laparoscopic radical prostatectomy and bilateral pelvic lymph node dissection.    SURGEON:  Chi Isaacs DO    ASSISTANT:  None.    ANESTHESIA:  General.    ESTIMATED BLOOD LOSS:  50 mL.    SPECIMENS REMOVED:  Prostate and bilateral pelvic lymph nodes.    INTRAOPERATIVE FINDINGS:  Please see dictated operative note.     COMPLICATIONS:  None immediate.    IMPLANTS:  None.    INDICATIONS:  This is a 53-year-old gentleman, who was recently diagnosed with Katrina 7 adenocarcinoma of the prostate on 2024.  His pre-biopsy PSA was 6.5 and clinical stage is T1c.  All risks, benefits, and alternatives of above-mentioned procedure have been discussed and he is willing to proceed at this time.    DESCRIPTION OF PROCEDURE:  The patient's consent was obtained.  The patient was brought back to the operating room, at which time he was placed in the supine position.  After the uneventful induction of general anesthesia, he was then placed in the low lithotomy position.  His genital and abdominal areas were prepped and draped and after sterile field applied.  An 18-Togolese Polanco catheter was placed to dependent drainage.  A small periumbilical incision was made and the Veress needle was inserted into the abdominal cavity without event.  The abdomen was then insufflated with CO2.  Ports were then placed in a standard robotic prostatectomy fashion under direct vision.  The patient was then placed in a

## 2024-03-28 NOTE — ANESTHESIA PROCEDURE NOTES
Airway  Date/Time: 3/28/2024 7:20 AM  Urgency: elective    Airway not difficult    General Information and Staff    Patient location during procedure: OR  Resident/CRNA: Madelin Beck APRN - CRNA  Performed: resident/CRNA   Performed by: Madelin Beck APRN - CRNA  Authorized by: Agustín Rodríguez MD      Indications and Patient Condition  Indications for airway management: anesthesia  Spontaneous Ventilation: absent  Sedation level: deep  Preoxygenated: yes  Patient position: sniffing  MILS not maintained throughout  Mask difficulty assessment: difficult bag mask (inadequate, unstable or two providers) +/- NMBA    Final Airway Details  Final airway type: endotracheal airway      Successful airway: ETT  Cuffed: yes   Successful intubation technique: direct laryngoscopy  Facilitating devices/methods: intubating stylet  Endotracheal tube insertion site: oral  Blade: Sincere  Blade size: #4  ETT size (mm): 8.0  Cormack-Lehane Classification: grade IIa - partial view of glottis  Placement verified by: chest auscultation and capnometry   Measured from: lips  ETT to lips (cm): 23  Number of attempts at approach: 1  Ventilation between attempts: bag mask  Number of other approaches attempted: 0    no

## 2024-03-28 NOTE — PERIOP NOTE
TRANSFER - OUT REPORT:    Verbal report given to KOLBY Maurice on Gabe Ray  being transferred to 6th Floor (633) for routine progression of patient care       Report consisted of patient’s Situation, Background, Assessment and   Recommendations(SBAR).     Information from the following report(s) Nurse Handoff Report, Index, Surgery Report, MAR, Cardiac Rhythm SR, and Neuro Assessment was reviewed with the receiving nurse.    Lines:   Peripheral IV 03/28/24 Distal;Left;Posterior Forearm (Active)   Site Assessment Clean, dry & intact 03/28/24 0953   Line Status Flushed;Infusing 03/28/24 0953   Line Care Connections checked and tightened 03/28/24 0953   Phlebitis Assessment No symptoms 03/28/24 0953   Infiltration Assessment 0 03/28/24 0953   Alcohol Cap Used No 03/28/24 0953   Dressing Status Clean, dry & intact 03/28/24 0953   Dressing Type Transparent 03/28/24 0953        Opportunity for questions and clarification was provided.      Patient transported with:   O2 @ 2 liters  Tech    VTE prophylaxis orders have been written for Gabe Ray.    Patient and family given floor number and nurses name.  Family updated re: pt status after security code verified.

## 2024-03-29 VITALS
RESPIRATION RATE: 16 BRPM | DIASTOLIC BLOOD PRESSURE: 83 MMHG | HEART RATE: 82 BPM | WEIGHT: 202 LBS | HEIGHT: 70 IN | BODY MASS INDEX: 28.92 KG/M2 | TEMPERATURE: 98.1 F | SYSTOLIC BLOOD PRESSURE: 119 MMHG | OXYGEN SATURATION: 98 %

## 2024-03-29 LAB
ANION GAP SERPL CALC-SCNC: 5 MMOL/L (ref 2–11)
BUN SERPL-MCNC: 9 MG/DL (ref 6–23)
CALCIUM SERPL-MCNC: 8.5 MG/DL (ref 8.3–10.4)
CHLORIDE SERPL-SCNC: 106 MMOL/L (ref 103–113)
CO2 SERPL-SCNC: 27 MMOL/L (ref 21–32)
CREAT SERPL-MCNC: 1 MG/DL (ref 0.8–1.5)
GLUCOSE SERPL-MCNC: 123 MG/DL (ref 65–100)
HCT VFR BLD AUTO: 38.7 % (ref 41.1–50.3)
HGB BLD-MCNC: 13 G/DL (ref 13.6–17.2)
POTASSIUM SERPL-SCNC: 4.2 MMOL/L (ref 3.5–5.1)
SODIUM SERPL-SCNC: 138 MMOL/L (ref 136–146)

## 2024-03-29 PROCEDURE — 2580000003 HC RX 258: Performed by: PHYSICIAN ASSISTANT

## 2024-03-29 PROCEDURE — 36415 COLL VENOUS BLD VENIPUNCTURE: CPT

## 2024-03-29 PROCEDURE — 6370000000 HC RX 637 (ALT 250 FOR IP): Performed by: UROLOGY

## 2024-03-29 PROCEDURE — 85018 HEMOGLOBIN: CPT

## 2024-03-29 PROCEDURE — 85014 HEMATOCRIT: CPT

## 2024-03-29 PROCEDURE — 2580000003 HC RX 258: Performed by: UROLOGY

## 2024-03-29 PROCEDURE — 6360000002 HC RX W HCPCS: Performed by: UROLOGY

## 2024-03-29 PROCEDURE — 80048 BASIC METABOLIC PNL TOTAL CA: CPT

## 2024-03-29 PROCEDURE — 1100000000 HC RM PRIVATE

## 2024-03-29 PROCEDURE — 99024 POSTOP FOLLOW-UP VISIT: CPT | Performed by: PHYSICIAN ASSISTANT

## 2024-03-29 PROCEDURE — 96376 TX/PRO/DX INJ SAME DRUG ADON: CPT

## 2024-03-29 PROCEDURE — G0378 HOSPITAL OBSERVATION PER HR: HCPCS

## 2024-03-29 PROCEDURE — 96374 THER/PROPH/DIAG INJ IV PUSH: CPT

## 2024-03-29 RX ORDER — CIPROFLOXACIN 500 MG/1
500 TABLET, FILM COATED ORAL 2 TIMES DAILY
Qty: 14 TABLET | Refills: 0 | Status: SHIPPED | OUTPATIENT
Start: 2024-03-29 | End: 2024-04-05

## 2024-03-29 RX ORDER — HYDROCODONE BITARTRATE AND ACETAMINOPHEN 5; 325 MG/1; MG/1
1 TABLET ORAL EVERY 4 HOURS PRN
Qty: 18 TABLET | Refills: 0 | Status: SHIPPED | OUTPATIENT
Start: 2024-03-29 | End: 2024-04-01

## 2024-03-29 RX ORDER — DOCUSATE SODIUM 100 MG/1
100 CAPSULE, LIQUID FILLED ORAL 2 TIMES DAILY
Qty: 60 CAPSULE | Refills: 0 | Status: SHIPPED | OUTPATIENT
Start: 2024-03-29 | End: 2024-04-28

## 2024-03-29 RX ADMIN — ACETAMINOPHEN 650 MG: 325 TABLET ORAL at 16:26

## 2024-03-29 RX ADMIN — HYDROCODONE BITARTRATE AND ACETAMINOPHEN 1 TABLET: 5; 325 TABLET ORAL at 13:02

## 2024-03-29 RX ADMIN — DEXTROSE AND SODIUM CHLORIDE: 5; 450 INJECTION, SOLUTION INTRAVENOUS at 05:58

## 2024-03-29 RX ADMIN — MORPHINE SULFATE 2 MG: 2 INJECTION, SOLUTION INTRAMUSCULAR; INTRAVENOUS at 03:16

## 2024-03-29 RX ADMIN — CEFAZOLIN 1000 MG: 1 INJECTION, POWDER, FOR SOLUTION INTRAMUSCULAR; INTRAVENOUS at 05:48

## 2024-03-29 RX ADMIN — DOCUSATE SODIUM 100 MG: 100 CAPSULE, LIQUID FILLED ORAL at 09:23

## 2024-03-29 RX ADMIN — MORPHINE SULFATE 2 MG: 2 INJECTION, SOLUTION INTRAMUSCULAR; INTRAVENOUS at 07:34

## 2024-03-29 RX ADMIN — DEXTROSE AND SODIUM CHLORIDE: 5; 450 INJECTION, SOLUTION INTRAVENOUS at 13:04

## 2024-03-29 RX ADMIN — MORPHINE SULFATE 2 MG: 2 INJECTION, SOLUTION INTRAMUSCULAR; INTRAVENOUS at 09:39

## 2024-03-29 ASSESSMENT — PAIN SCALES - GENERAL
PAINLEVEL_OUTOF10: 8
PAINLEVEL_OUTOF10: 8
PAINLEVEL_OUTOF10: 0
PAINLEVEL_OUTOF10: 7
PAINLEVEL_OUTOF10: 3
PAINLEVEL_OUTOF10: 2
PAINLEVEL_OUTOF10: 5

## 2024-03-29 ASSESSMENT — PAIN - FUNCTIONAL ASSESSMENT: PAIN_FUNCTIONAL_ASSESSMENT: ACTIVITIES ARE NOT PREVENTED

## 2024-03-29 ASSESSMENT — PAIN DESCRIPTION - LOCATION
LOCATION: ABDOMEN
LOCATION: ABDOMEN;BACK
LOCATION: ABDOMEN
LOCATION: ABDOMEN
LOCATION: ABDOMEN;GROIN

## 2024-03-29 ASSESSMENT — PAIN DESCRIPTION - DESCRIPTORS
DESCRIPTORS: ACHING
DESCRIPTORS: ACHING;DISCOMFORT;SORE
DESCRIPTORS: ACHING

## 2024-03-29 ASSESSMENT — PAIN DESCRIPTION - ORIENTATION: ORIENTATION: RIGHT;MID

## 2024-03-29 NOTE — PLAN OF CARE
Problem: Pain  Goal: Verbalizes/displays adequate comfort level or baseline comfort level  3/29/2024 1617 by Ruddy Jay, RN  Outcome: Adequate for Discharge  3/29/2024 1616 by Ruddy Jay, RN  Outcome: Adequate for Discharge     Problem: Discharge Planning  Goal: Discharge to home or other facility with appropriate resources  3/29/2024 1617 by Ruddy Jay, RN  Outcome: Adequate for Discharge  3/29/2024 1616 by Ruddy Jay, RN  Outcome: Adequate for Discharge

## 2024-03-29 NOTE — DISCHARGE SUMMARY
Discharge Summary    Date: 3/29/2024  Patient Name: Gabe Ray    YOB: 1970     Age: 53 y.o.    Admit Date: 3/28/2024  Discharge Date: 3/29/2024  Discharge Condition: Stable    Admission Diagnosis  Malignant neoplasm of prostate (HCC) [C61];Prostate cancer (HCC) [C61]      Discharge Diagnosis  Principal Problem:    Malignant neoplasm of prostate (HCC)  Active Problems:    Prostate cancer (HCC)  Resolved Problems:    * No resolved hospital problems. *      Hospital Stay  Narrative of Hospital Course:  The patient underwent robotic-assisted laparoscopic radical prostatectomy. Procedure was without complications. Following the procedure, patient was transferred to PACU and admitted to the urology floor for convalescence. Postoperatively, the patient progressed as expected. Diet was advanced and patient tolerated well. Patient had flatus and BM prior to d/c. He was discharged with keating in place.      Consultants:  None    Surgeries/procedures Performed:  Robotic-assisted laparoscopic radical prostatectomy and bilateral pelvic lymph node dissection.     Treatments:    Analgesia, Antibiotics, Surgery and IV Hydration        Discharge Plan/Disposition:  Home    Hospital/Incidental Findings Requiring Follow Up:    Patient Instructions:    Diet:    Activity:No Heavy Lifting  For number of days (if applicable):      Other Instructions:    Provider Follow-Up:   No follow-ups on file.     Significant Diagnostic Studies:    Recent Labs:  Admission on 03/28/2024  Crossmatch expiration date                    Date: 03/28/2024  Value: 03/31/2024,2359                       Status: Final  ABO/Rh                                        Date: 03/28/2024  Value: O POSITIVE    Status: Final  Antibody Screen                               Date: 03/28/2024  Value: NEG           Status: Final  Hemoglobin                                    Date: 03/28/2024  Value: 13.9        Ref range: 13.6 - 17.2 g/dL   Status:  03/29/2024  Value: 8.5         Ref range: 8.3 - 10.4 MG/DL   Status: Final  Hemoglobin                                    Date: 03/29/2024  Value: 13.0 (L)    Ref range: 13.6 - 17.2 g/dL   Status: Final  Hematocrit                                    Date: 03/29/2024  Value: 38.7 (L)    Ref range: 41.1 - 50.3 %      Status: Final  ------------    Radiology last 7 days:  XR CHEST (2 VW)    Result Date: 3/25/2024  No significant changes compared to prior study with no acute findings in the chest. Stable linear atelectasis at the left lingula.        [unfilled]    Discharge Medications    Current Discharge Medication List    START taking these medications    docusate sodium (COLACE) 100 MG capsule  Take 1 capsule by mouth 2 times daily  Qty: 60 capsule Refills: 0    ciprofloxacin (CIPRO) 500 MG tablet  Take 1 tablet by mouth 2 times daily for 7 days  Qty: 14 tablet Refills: 0    HYDROcodone-acetaminophen (NORCO) 5-325 MG per tablet  Take 1 tablet by mouth every 4 hours as needed for Pain for up to 3 days. Intended supply: 3 days. Take lowest dose possible to manage pain Max Daily Amount: 6 tablets  Qty: 18 tablet Refills: 0  Comments: Reduce doses taken as pain becomes manageable  Associated Diagnoses:Prostate cancer (HCC)          Current Discharge Medication List        Current Discharge Medication List    CONTINUE these medications which have NOT CHANGED    vitamin C (ASCORBIC ACID) 500 MG tablet  Take 4 tablets by mouth 2 times daily    Turmeric (QC TUMERIC COMPLEX PO)  Take 1 capsule by mouth daily    acetaminophen (TYLENOL) 500 MG tablet  Take 2 tablets by mouth in the morning and at bedtime    vitamin D (ERGOCALCIFEROL) 1.25 MG (87776 UT) CAPS capsule  Take 1 capsule by mouth once a week  Qty: 12 capsule Refills: 1  Associated Diagnoses:Avitaminosis D    escitalopram (LEXAPRO) 10 MG tablet  Take 1 tablet by mouth nightly    zolpidem (AMBIEN) 10 MG tablet  Take 1 tablet by mouth nightly as needed for Sleep for up

## 2024-03-29 NOTE — PROGRESS NOTES
Admit Date: 3/28/2024    Subjective:     Patient states pain well controlled. No flatus yet    Objective:     Patient Vitals for the past 8 hrs:   BP Temp Temp src Pulse Resp SpO2   03/29/24 0734 (!) 135/90 98.8 °F (37.1 °C) Oral 86 16 97 %   03/29/24 0401 119/87 98.6 °F (37 °C) Oral 94 18 94 %   03/29/24 0316 -- -- -- -- 18 --     No intake/output data recorded.  03/27 1901 - 03/29 0700  In: 2222.8 [I.V.:2222.8]  Out: 4215 [Urine:4025; Drains:90]    Physical Exam:  GENERAL ASSESSMENT: alert, oriented to person, place and time, no acute distress and no anxiety, depression or agitation  Chest: Easy work of breathing  CVS exam: RRR  ABDOMEN: soft, NTND  Neurological exam reveals alert, oriented, normal speech, no focal findings or movement disorder noted.  FEMALE GENITOURINARY EXAM: not done  MALE GENITAL EXAM: keating draining clear yellow urine        Data Review   Recent Results (from the past 24 hour(s))   Hemoglobin and Hematocrit    Collection Time: 03/28/24  9:44 AM   Result Value Ref Range    Hemoglobin 13.9 13.6 - 17.2 g/dL    Hematocrit 42.2 41.1 - 50.3 %   Basic Metabolic Panel    Collection Time: 03/29/24  6:27 AM   Result Value Ref Range    Sodium 138 136 - 146 mmol/L    Potassium 4.2 3.5 - 5.1 mmol/L    Chloride 106 103 - 113 mmol/L    CO2 27 21 - 32 mmol/L    Anion Gap 5 2 - 11 mmol/L    Glucose 123 (H) 65 - 100 mg/dL    BUN 9 6 - 23 MG/DL    Creatinine 1.00 0.8 - 1.5 MG/DL    Est, Glom Filt Rate >90 >60 ml/min/1.73m2    Calcium 8.5 8.3 - 10.4 MG/DL   Hemoglobin and Hematocrit    Collection Time: 03/29/24  6:27 AM   Result Value Ref Range    Hemoglobin 13.0 (L) 13.6 - 17.2 g/dL    Hematocrit 38.7 (L) 41.1 - 50.3 %       No results found.      Assessment:     Principal Problem:    Malignant neoplasm of prostate (HCC)  Active Problems:    Prostate cancer (HCC)  Resolved Problems:    * No resolved hospital problems. *    S/P RALP POD1. No flatus yet. ALLISON output 50cc overnight.  Plan:   Remove ALLISON

## 2024-03-29 NOTE — PROGRESS NOTES
Keating bag switched over to a leg bag.Discharge instructions given to patient for keating care/teachings. Pt voiced no questions.

## 2024-03-29 NOTE — PROGRESS NOTES
Pt medicated and IVF infusing per MAR. He received PRN tylenol, norco, and morphine one time for back and abdominal pain. Pt reports flatus, but no BM this shift. He walked a total of 1,500 ft tonight, tolerated well. A total of 3,125 mLs of yellow, clear urine was emptied from keating. 50 mLs of bloody output was emptied from ALLISON drain. All known needs met at this time. Bed locked and lowered with call light within reach.

## 2024-03-29 NOTE — CARE COORDINATION
CM spoke to Mr. Ray and his wife in room 607 about discharge planning.  He is observation status POD #1 radical prostatectomy.    Prior to admit, he was independent with ADLs.  At discharge, plan is home, no additional needs voiced or identified.      03/29/24 0725   Service Assessment   Patient Orientation Alert and Oriented   Cognition Alert   History Provided By Patient   Primary Caregiver Self   Accompanied By/Relationship wife   Support Systems Spouse/Significant Other   PCP Verified by CM Yes   Prior Functional Level Independent in ADLs/IADLs   Current Functional Level Independent in ADLs/IADLs   Can patient return to prior living arrangement Yes   Ability to make needs known: Good   Family able to assist with home care needs: Yes   Would you like for me to discuss the discharge plan with any other family members/significant others, and if so, who? No   Condition of Participation: Discharge Planning   The Plan for Transition of Care is related to the following treatment goals: home when stable

## 2024-03-30 PROBLEM — J02.9 SORE THROAT: Status: RESOLVED | Noted: 2024-02-29 | Resolved: 2024-03-30

## 2024-04-05 ENCOUNTER — OFFICE VISIT (OUTPATIENT)
Dept: UROLOGY | Age: 54
End: 2024-04-05
Payer: COMMERCIAL

## 2024-04-05 DIAGNOSIS — C61 PROSTATE CANCER (HCC): Primary | ICD-10-CM

## 2024-04-05 PROCEDURE — 99024 POSTOP FOLLOW-UP VISIT: CPT | Performed by: UROLOGY

## 2024-04-05 RX ORDER — TADALAFIL 20 MG/1
20 TABLET ORAL PRN
Qty: 20 TABLET | Refills: 6 | Status: SHIPPED | OUTPATIENT
Start: 2024-04-05

## 2024-04-05 NOTE — PROGRESS NOTES
[facility]  [address] [place] [address]  [phone]    Gabe Ray  : 1970     HPI   53 y.o., male returns in follow up for CaP.  S/P RALP on 3/28/24.  Path showed [place] 4+3, T2aN0 with neg margins.  He has done well post op.      Past Medical History:   Diagnosis Date    COVID-19     x 3last time     Depression with anxiety     Elevated PSA 2024    History of hypertension     no longer requires,    Kidney stone     Megaloblastic erythrocytes     Non-melanoma skin cancer     Prostate cancer (HCC)      Past Surgical History:   Procedure Laterality Date    APPENDECTOMY      COLONOSCOPY      PROSTATE BIOPSY N/A 2024    PROSTATE BIOPSY FUSION performed by Chi Isaacs DO at [facility] MAIN OR    PROSTATECTOMY N/A 3/28/2024    PROSTATECTOMY LAPAROSCOPIC ROBOTIC/ POSSIBLE BILATERAL LYMPH NODE DISSECTION performed by Chi Isaacs DO at [facility] MAIN OR     Current Outpatient Medications   Medication Sig Dispense Refill    tadalafil (CIALIS) 20 MG tablet Take 1 tablet by mouth as needed for Erectile Dysfunction 20 tablet 6    docusate sodium (COLACE) 100 MG capsule Take 1 capsule by mouth 2 times daily 60 capsule 0    ciprofloxacin (CIPRO) 500 MG tablet Take 1 tablet by mouth 2 times daily for 7 days 14 tablet 0    vitamin C (ASCORBIC ACID) 500 MG tablet Take 4 tablets by mouth 2 times daily      Turmeric (QC TUMERIC COMPLEX PO) Take 1 capsule by mouth daily      vitamin D (ERGOCALCIFEROL) 1.25 MG (33201 UT) CAPS capsule Take 1 capsule by mouth once a week (Patient taking differently: Take 1 capsule by mouth once a week Takes on Monday) 12 capsule 1    escitalopram (LEXAPRO) 10 MG tablet Take 1 tablet by mouth nightly      zolpidem (AMBIEN) 10 MG tablet Take 1 tablet by mouth nightly as needed for Sleep for up to 180 days. Max Daily Amount: 10 mg 30 tablet 5    acetaminophen (TYLENOL) 500 MG tablet Take 2 tablets by mouth in the morning and at bedtime

## 2024-05-01 ENCOUNTER — HOSPITAL ENCOUNTER (OUTPATIENT)
Dept: PHYSICAL THERAPY | Age: 54
Setting detail: RECURRING SERIES
Discharge: HOME | End: 2024-05-04
Payer: COMMERCIAL

## 2024-05-01 DIAGNOSIS — N39.46 MIXED INCONTINENCE: Primary | ICD-10-CM

## 2024-05-01 DIAGNOSIS — M54.59 OTHER LOW BACK PAIN: ICD-10-CM

## 2024-05-01 DIAGNOSIS — R27.9 UNSPECIFIED LACK OF COORDINATION: ICD-10-CM

## 2024-05-01 DIAGNOSIS — R30.1 VESICAL TENESMUS: ICD-10-CM

## 2024-05-01 DIAGNOSIS — R39.89 OTHER SYMPTOMS AND SIGNS INVOLVING THE GENITOURINARY SYSTEM: ICD-10-CM

## 2024-05-01 PROCEDURE — 97530 THERAPEUTIC ACTIVITIES: CPT

## 2024-05-01 PROCEDURE — 97161 PT EVAL LOW COMPLEX 20 MIN: CPT

## 2024-05-01 ASSESSMENT — PAIN SCALES - GENERAL: PAINLEVEL_OUTOF10: 0

## 2024-05-01 NOTE — PROGRESS NOTES
Gabe Ray  : 1970  Primary: [employer] ([employer])  Secondary:  [facility]  [address]  [address]  [facility] [address]  Phone: [phone]  Fax: [phone] Plan Frequency: 1x/week to once every other week  Plan of Care/Certification Expiration Date: 24        Plan of Care/Certification Expiration Date:  Plan of Care/Certification Expiration Date: 24    Frequency/Duration: Plan Frequency: 1x/week to once every other week      Time In/Out:   Time In: 1600  Time Out: 1700      PT Visit Info:    Total # of Visits Approved: 60  Total # of Visits to Date: 1      Visit Count:  1    OUTPATIENT PHYSICAL THERAPY:   Treatment Note 2024       Episode  (PFPT- post prostatectomy)               Treatment Diagnosis:    Mixed incontinence  Unspecified lack of coordination  Vesical tenesmus  Other symptoms and signs involving the genitourinary system  Other low back pain  Contributing Diagnosis:  Frequency of micturition (R35.0) and Malignant neoplasm of prostate (C61)  Medical/Referring Diagnosis:    Malignant neoplasm of prostate [C61]      Referring Physician:  Chi Isaacs DO MD Orders:  PT Eval and Treat   Return MD Appt:  not scheduled   Date of Onset:  Onset Date: 24  Allergies:   Lisinopril, Penicillins, and Ace inhibitors  Restrictions/Precautions:   None      Interventions Planned (Treatment may consist of any combination of the following):   See Assessment Note    Subjective Comments:   S/p RALP on 3/28/24, did not show for pre op PT  Initial Pain Level::     0/10  Post Session Pain Level:       0/10  Medications Last Reviewed:  2024  Updated Objective Findings:  See Evaluation Note from today  Treatment   THERAPEUTIC ACTIVITY: ( 25 minutes): Functional activity education on avoiding bladder irritants, substitutions for common irritants, recommended fluid intake (types and spacing), appropriate voiding frequency, weight management and overall contributing 
yes

## 2024-05-01 NOTE — THERAPY EVALUATION
Colonoscopy (2023); Prostate biopsy (N/A, 1/29/2024); and Prostatectomy (N/A, 3/28/2024).    Social History/Living Environment:   Patient with roommate  Type of Home: House: Two Story      Level of Function/Work/Activity:    Prior Level of Function: Independent   Current Level of Function: Independent   Employment Status: Employed full time  Occupation:  at [employer], hoping to return to work on 5/22/24  Job Duties: bending, lifting 40-50# repetitively, reaching- works 19 days straight and then 2 days off  Current Exercise Program/Activities: walking dogs, working around farm        Learning:   Does the patient/guardian have any barriers to learning?: No barriers  Will there be a co-learner?: No  What is the preferred language of the patient/guardian?: English  Is an  required?: No  How does the patient/guardian prefer to learn new concepts?: Listening; Reading; Pictures/Videos; Demonstration    Fall Risk Scale:  Newman Total Score: 0     OBJECTIVE   No objective measures taken today due to extensive medical history and patient questions/education.    ASSESSMENT   Initial Assessment: Mr. Ray presents s/p RALP on 3/28/24 2/2 prostate cancer. He is now experiencing stress urinary incontinence, requiring Depends briefs for protection. Urinary incontinence with stress related activities and/or urgency. Provoking activities including squatting, lifting, coughing, laughing and sneezing. He does report significant consumption of bladder irritants. He demonstrate breath holding with various movements which in likely contributing to KIRTI symptoms. Pt will continue to benefit from skilled PT with an emphasis on PFM retraining, coordination, strength, integration with movement and bladder health education in order to return to PLOF.     Therapy Problem List: (Impacting functional limitations):    Increased Pain, Decreased Strength, Decreased ROM, Decreased Functional Mobility, Decreased [place]

## 2024-05-02 ENCOUNTER — TELEPHONE (OUTPATIENT)
Dept: UROLOGY | Age: 54
End: 2024-05-02

## 2024-05-02 NOTE — TELEPHONE ENCOUNTER
Pt called saying [employer] is supposed to be sending over paperwork to get disability to get extended. Forms should have been sent over from the company yesterday to be filled out yesterday. He hasn't paid the $25 fee yet and would like to call to know when to pick them up or when they'll be ready.

## 2024-05-09 ENCOUNTER — HOSPITAL ENCOUNTER (OUTPATIENT)
Dept: PHYSICAL THERAPY | Age: 54
Setting detail: RECURRING SERIES
Discharge: HOME | End: 2024-05-12
Payer: COMMERCIAL

## 2024-05-09 PROCEDURE — 97530 THERAPEUTIC ACTIVITIES: CPT

## 2024-05-09 PROCEDURE — 97110 THERAPEUTIC EXERCISES: CPT

## 2024-05-09 ASSESSMENT — PAIN SCALES - GENERAL: PAINLEVEL_OUTOF10: 0

## 2024-05-09 NOTE — PROGRESS NOTES
Gabe Ray  : 1970  Primary: [employer] ([employer])  Secondary:  [facility]  [address]  [address]  [facility] [address]  Phone: [phone]  Fax: [phone] Plan Frequency: 1x/week to once every other week  Plan of Care/Certification Expiration Date: 24        Plan of Care/Certification Expiration Date:  Plan of Care/Certification Expiration Date: 24    Frequency/Duration: Plan Frequency: 1x/week to once every other week      Time In/Out:   Time In: 1005  Time Out: 1111      PT Visit Info:    Total # of Visits Approved: 60  Total # of Visits to Date: 2      Visit Count:  2    OUTPATIENT PHYSICAL THERAPY:   Treatment Note 2024       Episode  (PFPT- post prostatectomy)               Treatment Diagnosis:    Mixed incontinence  Unspecified lack of coordination  Vesical tenesmus  Other symptoms and signs involving the genitourinary system  Other low back pain  Contributing Diagnosis:  Frequency of micturition (R35.0) and Malignant neoplasm of prostate (C61)  Medical/Referring Diagnosis:    Malignant neoplasm of prostate [C61]      Referring Physician:  Chi Isaacs DO MD Orders:  PT Eval and Treat   Return MD Appt:  not scheduled   Date of Onset:  Onset Date: 24  Allergies:   Lisinopril, Penicillins, and Ace inhibitors  Restrictions/Precautions:   None      Interventions Planned (Treatment may consist of any combination of the following):   See Assessment Note    Subjective Comments:   Pt states that he has been walking daily. Did some yard work the other day and has been having some problems with what feel like hemorrhoids. Burning, itching around the anus and some blood when wiping on the toilet paper. Has had some soreness in his hips with increasing activity. Tylenol helped to alleviate. Overall leakage with activities has been too bad but notices more the other evening just sitting on the couch and shifting.   Initial Pain Level::     0/10  Post Session

## 2024-05-13 ENCOUNTER — OFFICE VISIT (OUTPATIENT)
Dept: FAMILY MEDICINE CLINIC | Facility: CLINIC | Age: 54
End: 2024-05-13
Payer: COMMERCIAL

## 2024-05-13 VITALS
WEIGHT: 205 LBS | HEIGHT: 70 IN | BODY MASS INDEX: 29.35 KG/M2 | SYSTOLIC BLOOD PRESSURE: 144 MMHG | DIASTOLIC BLOOD PRESSURE: 87 MMHG

## 2024-05-13 DIAGNOSIS — D64.9 ANEMIA, UNSPECIFIED TYPE: ICD-10-CM

## 2024-05-13 DIAGNOSIS — E55.9 AVITAMINOSIS D: ICD-10-CM

## 2024-05-13 DIAGNOSIS — C61 MALIGNANT NEOPLASM OF PROSTATE (HCC): Primary | ICD-10-CM

## 2024-05-13 LAB
25(OH)D3 SERPL-MCNC: 35.9 NG/ML (ref 30–100)
BASOPHILS # BLD: 0.1 K/UL (ref 0–0.2)
BASOPHILS NFR BLD: 1 % (ref 0–2)
DIFFERENTIAL METHOD BLD: NORMAL
EOSINOPHIL # BLD: 0.2 K/UL (ref 0–0.8)
EOSINOPHIL NFR BLD: 2 % (ref 0.5–7.8)
ERYTHROCYTE [DISTWIDTH] IN BLOOD BY AUTOMATED COUNT: 13 % (ref 11.9–14.6)
FERRITIN SERPL-MCNC: 185 NG/ML (ref 8–388)
HCT VFR BLD AUTO: 43.6 % (ref 41.1–50.3)
HGB BLD-MCNC: 14.2 G/DL (ref 13.6–17.2)
IMM GRANULOCYTES # BLD AUTO: 0 K/UL (ref 0–0.5)
IMM GRANULOCYTES NFR BLD AUTO: 0 % (ref 0–5)
IRON SATN MFR SERPL: 29 % (ref 20–50)
IRON SERPL-MCNC: 96 UG/DL (ref 35–100)
LYMPHOCYTES # BLD: 3.1 K/UL (ref 0.5–4.6)
LYMPHOCYTES NFR BLD: 42 % (ref 13–44)
MCH RBC QN AUTO: 30.8 PG (ref 26.1–32.9)
MCHC RBC AUTO-ENTMCNC: 32.6 G/DL (ref 31.4–35)
MCV RBC AUTO: 94.6 FL (ref 82–102)
MONOCYTES # BLD: 0.5 K/UL (ref 0.1–1.3)
MONOCYTES NFR BLD: 7 % (ref 4–12)
NEUTS SEG # BLD: 3.5 K/UL (ref 1.7–8.2)
NEUTS SEG NFR BLD: 48 % (ref 43–78)
NRBC # BLD: 0 K/UL (ref 0–0.2)
PLATELET # BLD AUTO: 341 K/UL (ref 150–450)
PMV BLD AUTO: 10.1 FL (ref 9.4–12.3)
RBC # BLD AUTO: 4.61 M/UL (ref 4.23–5.6)
TIBC SERPL-MCNC: 330 UG/DL (ref 240–450)
UIBC SERPL-MCNC: 234 UG/DL (ref 112–347)
WBC # BLD AUTO: 7.3 K/UL (ref 4.3–11.1)

## 2024-05-13 PROCEDURE — 99214 OFFICE O/P EST MOD 30 MIN: CPT | Performed by: FAMILY MEDICINE

## 2024-05-13 PROCEDURE — 3077F SYST BP >= 140 MM HG: CPT | Performed by: FAMILY MEDICINE

## 2024-05-13 PROCEDURE — 3079F DIAST BP 80-89 MM HG: CPT | Performed by: FAMILY MEDICINE

## 2024-05-13 ASSESSMENT — ENCOUNTER SYMPTOMS
ABDOMINAL PAIN: 0
BLOOD IN STOOL: 0
ANAL BLEEDING: 1
SHORTNESS OF BREATH: 0
CHEST TIGHTNESS: 0

## 2024-05-13 NOTE — PROGRESS NOTES
[facility]  _______________________________________  MD Piedad Ivan, ROSELINE Denise, MD Alea Willis MD    [address] [place] [address]  Phone: [phone]  Fax: [phone]      Gabe Ray (:  1970) is a 53 y.o. male,Established patient, here for evaluation of the following chief complaint(s):  Rectal Bleeding (Had surgery in March will have bleeding when he is using his arms to pull anything. Released by Urology and seeing PT currently )      Assessment & Plan   1. Malignant neoplasm of prostate (HCC)  I don't see any cause for bleeding on my exam. I suspect it's coming from his surgical site. I asked him to call urology for FU on this. If they demur, would have to get him back in with GI ASAP to find the bleeding source. He can't go back to work like this. I offered to amend his FMLA if needed.     2. Avitaminosis D  Stable, continue current regimen.   Check levels.   - Vitamin D 25 Hydroxy; Future    3. Anemia, unspecified type  Trend his Hgb to make sure he's not losing too much blood through the rectum.   - CBC with Auto Differential; Future  - Iron and TIBC; Future  - Ferritin; Future        Return in about 6 months (around 2024) for CPE, move  labs. .       Subjective     Here for FU:    Prostate Cancer: Had [address] 7 lesion son biopsy, Had RALP on 3/28/24. Following with urology. As above he's having some post op bleeding still when doing valsalva.     Low D: We put him on replacement supplement before the winter.   Lab Results   Component Value Date    VITD25 34.4 2023     Lab Results   Component Value Date    WBC 5.9 2024    HGB 13.0 (L) 2024    HCT 38.7 (L) 2024    MCV 91.9 2024     2024     Lab Results   Component Value Date    EHBANITR24 477 2023       When Dc'd from the hospital, Hgb had dropped slightly. Likely from his surgery.       Review

## 2024-05-15 ENCOUNTER — HOSPITAL ENCOUNTER (OUTPATIENT)
Dept: PHYSICAL THERAPY | Age: 54
Setting detail: RECURRING SERIES
Discharge: HOME | End: 2024-05-18
Payer: COMMERCIAL

## 2024-05-15 PROCEDURE — 97110 THERAPEUTIC EXERCISES: CPT

## 2024-05-15 PROCEDURE — 97530 THERAPEUTIC ACTIVITIES: CPT

## 2024-05-15 ASSESSMENT — PAIN SCALES - GENERAL: PAINLEVEL_OUTOF10: 0

## 2024-05-15 NOTE — PROGRESS NOTES
Gabe Ray  : 1970  Primary: Rosa Harley (Commercial)  Secondary:  O [facility]  [address]  [address]  [facility] [address]  Phone: [phone]  Fax: [phone] Plan Frequency: 1x/week to once every other week  Plan of Care/Certification Expiration Date: 24        Plan of Care/Certification Expiration Date:  Plan of Care/Certification Expiration Date: 24    Frequency/Duration: Plan Frequency: 1x/week to once every other week      Time In/Out:   Time In: 1502  Time Out: 1600      PT Visit Info:    Total # of Visits Approved: 60  Total # of Visits to Date: 3      Visit Count:  3    OUTPATIENT PHYSICAL THERAPY:   Treatment Note 5/15/2024       Episode  (PFPT- post prostatectomy)               Treatment Diagnosis:    Mixed incontinence  Unspecified lack of coordination  Vesical tenesmus  Other symptoms and signs involving the genitourinary system  Other low back pain  Contributing Diagnosis:  Frequency of micturition (R35.0) and Malignant neoplasm of prostate (C61)  Medical/Referring Diagnosis:    Malignant neoplasm of prostate [C61]      Referring Physician:  Chi Isaacs DO MD Orders:  PT Eval and Treat   Return MD Appt:  not scheduled   Date of Onset:  Onset Date: 24  Allergies:   Lisinopril, Penicillins, and Ace inhibitors  Restrictions/Precautions:   None      Interventions Planned (Treatment may consist of any combination of the following):   See Assessment Note    Subjective Comments:   Pt reports mild improvements in urinary leakage. He continues to have some mild, intermittent pain mostly with heavier house and yard work or when he's \"over doing it\". He has not had any more bleeding, however. Urinary frequency and urgency is also improving. Hoping to return to work next week.  Initial Pain Level::     0/10  Post Session Pain Level:       0/10  Medications Last Reviewed:  5/15/2024  Updated Objective Findings:  None Today    Treatment   THERAPEUTIC

## 2024-05-24 ENCOUNTER — TELEPHONE (OUTPATIENT)
Dept: UROLOGY | Age: 54
End: 2024-05-24

## 2024-05-24 NOTE — TELEPHONE ENCOUNTER
Called patient back to see about getting a return to work letter filled out. Left him a voicemail to give us a call back.

## 2024-05-29 ENCOUNTER — TELEPHONE (OUTPATIENT)
Dept: UROLOGY | Age: 54
End: 2024-05-29

## 2024-05-29 ENCOUNTER — HOSPITAL ENCOUNTER (OUTPATIENT)
Dept: PHYSICAL THERAPY | Age: 54
Setting detail: RECURRING SERIES
Discharge: HOME | End: 2024-06-01
Payer: COMMERCIAL

## 2024-05-29 PROCEDURE — 97530 THERAPEUTIC ACTIVITIES: CPT

## 2024-05-29 PROCEDURE — 97110 THERAPEUTIC EXERCISES: CPT

## 2024-05-29 ASSESSMENT — PAIN SCALES - GENERAL: PAINLEVEL_OUTOF10: 0

## 2024-05-29 NOTE — PROGRESS NOTES
Gabe Ray  : 1970  Primary: Rosa Harley (Commercial)  Secondary:  O [facility]  [address]  [address]  [facility] [address]  Phone: [phone]  Fax: [phone] Plan Frequency: 1x/week to once every other week  Plan of Care/Certification Expiration Date: 24        Plan of Care/Certification Expiration Date:  Plan of Care/Certification Expiration Date: 24    Frequency/Duration: Plan Frequency: 1x/week to once every other week      Time In/Out:   Time In: 0803  Time Out: 0850      PT Visit Info:    Total # of Visits Approved: 60  Total # of Visits to Date: 4      Visit Count:  4    OUTPATIENT PHYSICAL THERAPY:   Treatment Note 2024       Episode  (PFPT- post prostatectomy)               Treatment Diagnosis:    Mixed incontinence  Unspecified lack of coordination  Vesical tenesmus  Other symptoms and signs involving the genitourinary system  Other low back pain  Contributing Diagnosis:  Frequency of micturition (R35.0) and Malignant neoplasm of prostate (C61)  Medical/Referring Diagnosis:    Malignant neoplasm of prostate [C61]      Referring Physician:  Chi Isaacs DO MD Orders:  PT Eval and Treat   Return MD Appt:  not scheduled   Date of Onset:  Onset Date: 24  Allergies:   Lisinopril, Penicillins, and Ace inhibitors  Restrictions/Precautions:   None      Interventions Planned (Treatment may consist of any combination of the following):   See Assessment Note    Subjective Comments:   Pt has been doing very well. He has not been having pain, more mm soreness at this point. He is back to riding his horses. He is using urge suppression with good success. He has not returned to work due to some confusion with medical clearance needed by her HR department.   Initial Pain Level::     0/10  Post Session Pain Level:       0/10  Medications Last Reviewed:  2024  Updated Objective Findings:  None Today    Treatment   THERAPEUTIC EXERCISE: (10 minutes):

## 2024-06-04 ENCOUNTER — APPOINTMENT (OUTPATIENT)
Dept: PHYSICAL THERAPY | Age: 54
End: 2024-06-04
Payer: COMMERCIAL

## 2024-06-11 ENCOUNTER — APPOINTMENT (OUTPATIENT)
Dept: PHYSICAL THERAPY | Age: 54
End: 2024-06-11
Payer: COMMERCIAL

## 2024-06-18 ENCOUNTER — APPOINTMENT (OUTPATIENT)
Dept: PHYSICAL THERAPY | Age: 54
End: 2024-06-18
Payer: COMMERCIAL

## 2024-06-26 ENCOUNTER — OFFICE VISIT (OUTPATIENT)
Dept: FAMILY MEDICINE CLINIC | Facility: CLINIC | Age: 54
End: 2024-06-26
Payer: COMMERCIAL

## 2024-06-26 VITALS
BODY MASS INDEX: 29.78 KG/M2 | HEART RATE: 84 BPM | SYSTOLIC BLOOD PRESSURE: 136 MMHG | OXYGEN SATURATION: 98 % | DIASTOLIC BLOOD PRESSURE: 86 MMHG | WEIGHT: 208 LBS | HEIGHT: 70 IN | RESPIRATION RATE: 12 BRPM

## 2024-06-26 DIAGNOSIS — G56.21 ULNAR NERVE PALSY OF RIGHT UPPER EXTREMITY: ICD-10-CM

## 2024-06-26 DIAGNOSIS — M25.421 ELBOW SWELLING, RIGHT: Primary | ICD-10-CM

## 2024-06-26 DIAGNOSIS — N52.39 POST-SURGICAL ERECTILE DYSFUNCTION: ICD-10-CM

## 2024-06-26 PROCEDURE — 3079F DIAST BP 80-89 MM HG: CPT | Performed by: FAMILY MEDICINE

## 2024-06-26 PROCEDURE — 3075F SYST BP GE 130 - 139MM HG: CPT | Performed by: FAMILY MEDICINE

## 2024-06-26 PROCEDURE — 99214 OFFICE O/P EST MOD 30 MIN: CPT | Performed by: FAMILY MEDICINE

## 2024-06-26 RX ORDER — TADALAFIL 5 MG/1
5 TABLET ORAL DAILY
Qty: 90 TABLET | Refills: 1 | Status: SHIPPED | OUTPATIENT
Start: 2024-06-26

## 2024-06-26 ASSESSMENT — PATIENT HEALTH QUESTIONNAIRE - PHQ9
4. FEELING TIRED OR HAVING LITTLE ENERGY: NOT AT ALL
8. MOVING OR SPEAKING SO SLOWLY THAT OTHER PEOPLE COULD HAVE NOTICED. OR THE OPPOSITE, BEING SO FIGETY OR RESTLESS THAT YOU HAVE BEEN MOVING AROUND A LOT MORE THAN USUAL: NOT AT ALL
1. LITTLE INTEREST OR PLEASURE IN DOING THINGS: NOT AT ALL
SUM OF ALL RESPONSES TO PHQ QUESTIONS 1-9: 0
2. FEELING DOWN, DEPRESSED OR HOPELESS: NOT AT ALL
5. POOR APPETITE OR OVEREATING: NOT AT ALL
SUM OF ALL RESPONSES TO PHQ9 QUESTIONS 1 & 2: 0
6. FEELING BAD ABOUT YOURSELF - OR THAT YOU ARE A FAILURE OR HAVE LET YOURSELF OR YOUR FAMILY DOWN: NOT AT ALL
3. TROUBLE FALLING OR STAYING ASLEEP: NOT AT ALL
SUM OF ALL RESPONSES TO PHQ QUESTIONS 1-9: 0
9. THOUGHTS THAT YOU WOULD BE BETTER OFF DEAD, OR OF HURTING YOURSELF: NOT AT ALL
7. TROUBLE CONCENTRATING ON THINGS, SUCH AS READING THE NEWSPAPER OR WATCHING TELEVISION: NOT AT ALL
SUM OF ALL RESPONSES TO PHQ QUESTIONS 1-9: 0
SUM OF ALL RESPONSES TO PHQ QUESTIONS 1-9: 0

## 2024-06-26 ASSESSMENT — ENCOUNTER SYMPTOMS
BLOOD IN STOOL: 0
ABDOMINAL PAIN: 0
SHORTNESS OF BREATH: 0
CHEST TIGHTNESS: 0

## 2024-06-26 NOTE — PROGRESS NOTES
[facility]  _______________________________________  MD Piedad Ivan, ROSELINE Denise, MD Alea Willis MD    [address] [place] [address]  Phone: [phone]  Fax: [phone]      Gabe Ray (:  1970) is a 53 y.o. male,Established patient, here for evaluation of the following chief complaint(s):  Joint Swelling (C/O swelling in right elbow, onset 2 months)      Assessment & Plan   1. Elbow swelling, right  This looks like patellar bursitis but it's been going on for a long time. Asking hand surgery to take a look and make sure I don't have the dx wrong. Appreciate their help.   - [facility] (Hand Surgery)    2. 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 Does this make aspiration necessary? Asking hand surgery for help, appreciate their help.   - [facility] (Hand Surgery)    3. Post-surgical erectile dysfunction  Will try daily dosing of cialis. FU with urology next month, pt to call to schedule this for his 3m post op check.   - tadalafil (CIALIS) 5 MG tablet; Take 1 tablet by mouth daily  Dispense: 90 tablet; Refill: 1      Return in about 5 months (around 2024) for `.       Subjective     Pt is here for R elbow swelling that has been swelling since surgery for his prostate 2m ago. No pain, but will get worse with use. At the end of the work day it will be very swollen and by the morning less so.     BP Readings from Last 3 Encounters:   24 136/86   24 (!) 144/87   24 119/83         Review of Systems   Constitutional:  Negative for chills and fever.   Respiratory:  Negative for chest tightness and shortness of breath.    Gastrointestinal:  Negative for abdominal pain and blood in stool.   Genitourinary:  Negative for hematuria.          Objective   Physical Exam  Vitals and nursing

## 2024-07-09 ENCOUNTER — HOSPITAL ENCOUNTER (OUTPATIENT)
Dept: PHYSICAL THERAPY | Age: 54
Setting detail: RECURRING SERIES
End: 2024-07-09
Payer: COMMERCIAL

## 2024-07-18 ENCOUNTER — HOSPITAL ENCOUNTER (OUTPATIENT)
Dept: PHYSICAL THERAPY | Age: 54
Setting detail: RECURRING SERIES
Discharge: HOME | End: 2024-07-21
Payer: COMMERCIAL

## 2024-07-18 PROCEDURE — 97530 THERAPEUTIC ACTIVITIES: CPT

## 2024-07-18 PROCEDURE — 97110 THERAPEUTIC EXERCISES: CPT

## 2024-07-18 NOTE — THERAPY DISCHARGE
Gabe Ray  : 1970  Primary: Rosa Harley (Commercial)  Secondary:  O [facility]  [address]  [address]  [facility] [address]  Phone: [phone]  Fax: [phone] Plan Frequency: 1x/week to once every other week    Plan of Care/Certification Expiration Date: 24        Plan of Care/Certification Expiration Date:  Plan of Care/Certification Expiration Date: 24    Frequency/Duration: Plan Frequency: 1x/week to once every other week      Time In/Out:   Time In: 1405  Time Out: 1433      PT Visit Info:    Plan Frequency: 1x/week to once every other week  Total # of Visits Approved: 60  Total # of Visits to Date: 5      Visit Count:  5                OUTPATIENT PHYSICAL THERAPY:             Discharge Summary 2024               Episode (PFPT- post prostatectomy)         Treatment Diagnosis:    Mixed incontinence  Unspecified lack of coordination  Vesical tenesmus  Other symptoms and signs involving the genitourinary system  Other low back pain  Contributing Diagnosis:  Frequency of micturition (R35.0) and Malignant neoplasm of prostate (C61)  Medical/Referring Diagnosis:    Malignant neoplasm of prostate [C61]      Referring Physician:  Chi Isaacs DO MD Orders:  PT Eval and Treat   Return MD Appt:  24, PSA prior  Date of Onset:  Onset Date: 24  Allergies:  Lisinopril, Penicillins, and Ace inhibitors  Restrictions/Precautions:    None      Medications Last Reviewed:  2024     SUBJECTIVE   History of Injury/Illness (Reason for Referral):  Mr. Ray is 54 yo male referred to pelvic floor physical therapy (PFPT) 2/2 prostate cancer by Chi Isaacs DO. He is s/p RALP on 3/28/24.    Pt has been having urinary leakage since catheter was removed. Gradually improving. Worse in the evening. KIRTI with movement- squatting, coughing, sneezing, passing gas. He is getting a urinary urge. He will sometimes have difficulty holding his bladder and settling

## 2024-07-18 NOTE — PROGRESS NOTES
Gabe Ray  : 1970  Primary: Rosa Harley (Commercial)  Secondary:  O [facility]  [address]  [address]  [facility] [address]  Phone: [phone]  Fax: [phone] Plan Frequency: 1x/week to once every other week  Plan of Care/Certification Expiration Date: 24        Plan of Care/Certification Expiration Date:  Plan of Care/Certification Expiration Date: 24    Frequency/Duration: Plan Frequency: 1x/week to once every other week      Time In/Out:   Time In: 1405  Time Out: 1433      PT Visit Info:    Total # of Visits Approved: 60  Total # of Visits to Date: 5      Visit Count:  5    OUTPATIENT PHYSICAL THERAPY:   Treatment Note 2024       Episode  (PFPT- post prostatectomy)               Treatment Diagnosis:    Mixed incontinence  Unspecified lack of coordination  Vesical tenesmus  Other symptoms and signs involving the genitourinary system  Other low back pain  Contributing Diagnosis:  Frequency of micturition (R35.0) and Malignant neoplasm of prostate (C61)  Medical/Referring Diagnosis:    Malignant neoplasm of prostate [C61]      Referring Physician:  Chi Isaacs DO MD Orders:  PT Eval and Treat   Return MD Appt:  not scheduled   Date of Onset:  Onset Date: 24  Allergies:   Lisinopril, Penicillins, and Ace inhibitors  Restrictions/Precautions:   None      Interventions Planned (Treatment may consist of any combination of the following):   See Assessment Note    Subjective Comments:   Has been doing very well. Back to work and has been very busy working a lot of over time. Down to 2 thin pads/day.  Initial Pain Level::     0/10  Post Session Pain Level:       0/10  Medications Last Reviewed:  2024  Updated Objective Findings:  See Discharge Note from today    Treatment   THERAPEUTIC EXERCISE: (10 minutes):  Exercises per grid below to improve mobility and coordination.  Required moderate visual and verbal cues to promote proper body breathing

## 2024-07-25 ENCOUNTER — OFFICE VISIT (OUTPATIENT)
Age: 54
End: 2024-07-25
Payer: COMMERCIAL

## 2024-07-25 DIAGNOSIS — M70.21 OLECRANON BURSITIS OF RIGHT ELBOW: Primary | ICD-10-CM

## 2024-07-25 PROCEDURE — 99204 OFFICE O/P NEW MOD 45 MIN: CPT | Performed by: NURSE PRACTITIONER

## 2024-07-25 NOTE — PROGRESS NOTES
[facility] Note    Name: Gabe Ray  YOB: 1970  Gender: male  MRN: [id number]      CC: Patient referred for evaluation of right elbow     HPI: Gabe Ray is a 53 y.o. male right hand dominant with a chief complaint of right elbow swelling.  He notes this started approximately a month ago.  He does report he thinks he hit his elbow on one of his machines at work.  He also notes that his arm was stretched out during surgery.  He also notes that he has been pushing up to get out of bed.  He has been wearing a compressive sleeve.  He does report he notes an improvement.  He denies fever, chills, nausea, vomiting.    ROS/Meds/PSH/PMH/FH/SH: I personally reviewed the patients standard intake form.  Pertinents are discussed in the HPI    Physical Examination:  General: Awake and alert.  HEENT: Normocephalic, atraumatic  CV/Pulm: Breathing even and unlabored  Skin: No obvious rashes noted.  Lymphatic: No obvious evidence of lymphedema or lymphadenopathy    Musculoskeletal Exam:  Examination on the right upper extremity demonstrates cap refill < 5 seconds in all fingers  Patient has swelling over the olecranon, consistent with olecranon bursitis.  Patient has painless range of motion.  He is nontender to palpation.  There is no erythema or drainage.  He denies paresthesia.  He has good capillary refill.    Imaging / Electrodiagnostic Tests:     X-rays include a 3 view right elbow are reviewed.  Patient has osteoarthritis.  He has narrowing on the medial side.  He also has a large bone spur off the olecranon.    Assessment:   1. Olecranon bursitis of right elbow        Plan:   We discussed the diagnosis and different treatment options. We discussed observation, therapy, antiinflammatory medications and other pertinent treatment modalities.    After discussing in detail the patient elects to proceed with Medrol, Mobic, compressive sleeve.  We discussed risk and benefits of

## 2024-08-21 ENCOUNTER — OFFICE VISIT (OUTPATIENT)
Dept: UROLOGY | Age: 54
End: 2024-08-21
Payer: COMMERCIAL

## 2024-08-21 DIAGNOSIS — C61 PROSTATE CANCER (HCC): Primary | ICD-10-CM

## 2024-08-21 DIAGNOSIS — R97.20 ELEVATED PROSTATE SPECIFIC ANTIGEN (PSA): ICD-10-CM

## 2024-08-21 DIAGNOSIS — C61 MALIGNANT NEOPLASM OF PROSTATE (HCC): ICD-10-CM

## 2024-08-21 DIAGNOSIS — N52.9 ERECTILE DYSFUNCTION, UNSPECIFIED ERECTILE DYSFUNCTION TYPE: ICD-10-CM

## 2024-08-21 DIAGNOSIS — C61 PROSTATE CANCER (HCC): ICD-10-CM

## 2024-08-21 DIAGNOSIS — E34.9 HYPOTESTOSTERONEMIA: ICD-10-CM

## 2024-08-21 LAB
BILIRUBIN, URINE, POC: NEGATIVE
BLOOD URINE, POC: NEGATIVE
GLUCOSE URINE, POC: NEGATIVE
KETONES, URINE, POC: NEGATIVE
LEUKOCYTE ESTERASE, URINE, POC: NEGATIVE
NITRITE, URINE, POC: NEGATIVE
PH, URINE, POC: 5.5 (ref 4.6–8)
PROTEIN,URINE, POC: NEGATIVE
SPECIFIC GRAVITY, URINE, POC: 1.02 (ref 1–1.03)
URINALYSIS CLARITY, POC: NORMAL
URINALYSIS COLOR, POC: NORMAL
UROBILINOGEN, POC: NORMAL

## 2024-08-21 PROCEDURE — 81003 URINALYSIS AUTO W/O SCOPE: CPT | Performed by: UROLOGY

## 2024-08-21 PROCEDURE — 99213 OFFICE O/P EST LOW 20 MIN: CPT | Performed by: UROLOGY

## 2024-08-21 RX ORDER — TADALAFIL 5 MG/1
5 TABLET ORAL DAILY
Qty: 30 TABLET | Refills: 11 | Status: SHIPPED | OUTPATIENT
Start: 2024-08-21

## 2024-08-21 ASSESSMENT — ENCOUNTER SYMPTOMS
NAUSEA: 0
BACK PAIN: 0

## 2024-08-21 NOTE — PROGRESS NOTES
follow-up.    Diagnoses and all orders for this visit:    Prostate cancer (HCC)  -     AMB POC URINALYSIS DIP STICK AUTO W/O MICRO  -     PSA, ultrasensitive; Future  -     PSA, ultrasensitive; Future    Malignant neoplasm of prostate (HCC)  -     AMB POC URINALYSIS DIP STICK AUTO W/O MICRO    Elevated prostate specific antigen (PSA)  -     AMB POC URINALYSIS DIP STICK AUTO W/O MICRO    Hypotestosteronemia  -     AMB POC URINALYSIS DIP STICK AUTO W/O MICRO    Erectile dysfunction, unspecified erectile dysfunction type  -     tadalafil (CIALIS) 5 MG tablet; Take 1 tablet by mouth daily     PSA today.   Will change to daily Cialis.   Follow up with Dr. Isaacs.     Follow-up and Dispositions    Return in about 3 months (around 11/21/2024) for ultrasensitive PSA.

## 2024-08-22 LAB — PSA SERPL DL<=0.01 NG/ML-MCNC: 0.02 NG/ML (ref 0–4)

## 2024-09-30 ENCOUNTER — TELEPHONE (OUTPATIENT)
Dept: UROLOGY | Age: 54
End: 2024-09-30

## 2024-10-02 ENCOUNTER — TELEPHONE (OUTPATIENT)
Dept: UROLOGY | Age: 54
End: 2024-10-02

## 2024-10-02 NOTE — TELEPHONE ENCOUNTER
Left vm with patient, advised PSA is undetectable and to call back to schedule 3 month office visit with Dr. Isaacs.  (needs appt with Ferny in 3 months)

## 2025-04-21 ENCOUNTER — TELEPHONE (OUTPATIENT)
Dept: UROLOGY | Age: 55
End: 2025-04-21

## 2025-04-21 NOTE — TELEPHONE ENCOUNTER
Pt called in requesting an appt with Ferny stating that he need to get his PSA checked every 6 month. Appt scheduled for 05/08/2025.

## 2025-05-08 ENCOUNTER — OFFICE VISIT (OUTPATIENT)
Dept: UROLOGY | Age: 55
End: 2025-05-08
Payer: COMMERCIAL

## 2025-05-08 DIAGNOSIS — C61 PROSTATE CANCER (HCC): ICD-10-CM

## 2025-05-08 DIAGNOSIS — C61 PROSTATE CANCER (HCC): Primary | ICD-10-CM

## 2025-05-08 LAB
BILIRUBIN, URINE, POC: NEGATIVE
BLOOD URINE, POC: NEGATIVE
GLUCOSE URINE, POC: NEGATIVE MG/DL
KETONES, URINE, POC: NEGATIVE MG/DL
LEUKOCYTE ESTERASE, URINE, POC: NEGATIVE
NITRITE, URINE, POC: NEGATIVE
PH, URINE, POC: 5.5 (ref 4.6–8)
PROTEIN,URINE, POC: NEGATIVE MG/DL
SPECIFIC GRAVITY, URINE, POC: 1.01 (ref 1–1.03)
URINALYSIS CLARITY, POC: NORMAL
URINALYSIS COLOR, POC: NORMAL
UROBILINOGEN, POC: NORMAL MG/DL

## 2025-05-08 PROCEDURE — 99213 OFFICE O/P EST LOW 20 MIN: CPT | Performed by: UROLOGY

## 2025-05-08 PROCEDURE — 81003 URINALYSIS AUTO W/O SCOPE: CPT | Performed by: UROLOGY

## 2025-05-08 NOTE — PROGRESS NOTES
[facility]  [address] [place] [address]  [phone]    Gabe Ray  : 1970     HPI   54 y.o., male returns in follow up for CaP. S/P RALP on 3/28/24. Path showed Katrina 4+3, T2aN0 with neg margins. He has done well post op.  Continent. Reports erections without and without daily Cialis.  Last PSA was 0.021 on 24.      Past Medical History:   Diagnosis Date    COVID-19     x 3last time     Depression with anxiety     Elevated PSA 2024    History of hypertension     no longer requires,    Kidney stone     Megaloblastic erythrocytes     Non-melanoma skin cancer     Prostate cancer (HCC)      Past Surgical History:   Procedure Laterality Date    APPENDECTOMY      COLONOSCOPY      PROSTATE BIOPSY N/A 2024    PROSTATE BIOPSY FUSION performed by Chi Isaacs DO at [facility] MAIN OR    PROSTATECTOMY N/A 3/28/2024    PROSTATECTOMY LAPAROSCOPIC ROBOTIC/ POSSIBLE BILATERAL LYMPH NODE DISSECTION performed by Chi Isaacs DO at [facility] MAIN OR     Current Outpatient Medications   Medication Sig Dispense Refill    tadalafil (CIALIS) 5 MG tablet Take 1 tablet by mouth daily 30 tablet 11    tadalafil (CIALIS) 5 MG tablet Take 1 tablet by mouth daily 90 tablet 1    tadalafil (CIALIS) 20 MG tablet Take 1 tablet by mouth as needed for Erectile Dysfunction 20 tablet 6    vitamin C (ASCORBIC ACID) 500 MG tablet Take 4 tablets by mouth 2 times daily      Turmeric (QC TUMERIC COMPLEX PO) Take 1 capsule by mouth daily      vitamin D (ERGOCALCIFEROL) 1.25 MG (82221 UT) CAPS capsule Take 1 capsule by mouth once a week 12 capsule 1    escitalopram (LEXAPRO) 10 MG tablet Take 1 tablet by mouth nightly      acetaminophen (TYLENOL) 500 MG tablet Take 2 tablets by mouth in the morning and at bedtime       No current facility-administered medications for this visit.     Allergies   Allergen Reactions    Lisinopril Cough    Penicillins Other (See Comments)     Per his

## 2025-05-09 LAB — PSA SERPL DL<=0.01 NG/ML-MCNC: 0.04 NG/ML (ref 0–4)

## 2025-05-11 ENCOUNTER — RESULTS FOLLOW-UP (OUTPATIENT)
Dept: UROLOGY | Age: 55
End: 2025-05-11

## 2025-05-15 DIAGNOSIS — C61 PROSTATE CANCER (HCC): Primary | ICD-10-CM

## 2025-05-15 NOTE — TELEPHONE ENCOUNTER
----- Message from Dr. Chi Isaacs, DO sent at 5/11/2025 11:52 AM EDT -----  PSA is up slightly.  Please make sure he has appt in 6 mo with uPSA prior.  Please let pt know about PSA.

## 2025-06-04 ENCOUNTER — HOSPITAL ENCOUNTER (EMERGENCY)
Age: 55
Discharge: ANOTHER HOSPITAL | End: 2025-06-04
Attending: EMERGENCY MEDICINE
Payer: COMMERCIAL

## 2025-06-04 ENCOUNTER — HOSPITAL ENCOUNTER (OUTPATIENT)
Age: 55
Setting detail: OBSERVATION
Discharge: HOME | End: 2025-06-06
Attending: STUDENT IN AN ORGANIZED HEALTH CARE EDUCATION/TRAINING PROGRAM | Admitting: FAMILY MEDICINE
Payer: COMMERCIAL

## 2025-06-04 ENCOUNTER — APPOINTMENT (OUTPATIENT)
Dept: CT IMAGING | Age: 55
End: 2025-06-04
Payer: COMMERCIAL

## 2025-06-04 VITALS
RESPIRATION RATE: 18 BRPM | BODY MASS INDEX: 28 KG/M2 | DIASTOLIC BLOOD PRESSURE: 98 MMHG | WEIGHT: 200 LBS | HEIGHT: 71 IN | HEART RATE: 83 BPM | TEMPERATURE: 98 F | SYSTOLIC BLOOD PRESSURE: 137 MMHG | OXYGEN SATURATION: 96 %

## 2025-06-04 DIAGNOSIS — G45.9 TIA (TRANSIENT ISCHEMIC ATTACK): Primary | ICD-10-CM

## 2025-06-04 DIAGNOSIS — R00.2 PALPITATIONS: ICD-10-CM

## 2025-06-04 DIAGNOSIS — R42 DIZZINESS: Primary | ICD-10-CM

## 2025-06-04 DIAGNOSIS — Q21.12 PFO (PATENT FORAMEN OVALE): ICD-10-CM

## 2025-06-04 DIAGNOSIS — G45.9 TIA (TRANSIENT ISCHEMIC ATTACK): ICD-10-CM

## 2025-06-04 LAB
ALBUMIN SERPL-MCNC: 4.7 G/DL (ref 3.5–5)
ALBUMIN/GLOB SERPL: 1.6 (ref 1–1.9)
ALP SERPL-CCNC: 61 U/L (ref 40–129)
ALT SERPL-CCNC: 23 U/L (ref 12–65)
ANION GAP SERPL CALC-SCNC: 12 MMOL/L (ref 7–16)
AST SERPL-CCNC: 25 U/L (ref 15–37)
BASOPHILS # BLD: 0.06 K/UL (ref 0–0.2)
BASOPHILS NFR BLD: 0.9 % (ref 0–2)
BILIRUB SERPL-MCNC: 0.5 MG/DL (ref 0–1.2)
BUN SERPL-MCNC: 17 MG/DL (ref 6–23)
CALCIUM SERPL-MCNC: 9.8 MG/DL (ref 8.8–10.2)
CHLORIDE SERPL-SCNC: 101 MMOL/L (ref 98–107)
CO2 SERPL-SCNC: 25 MMOL/L (ref 20–29)
CREAT SERPL-MCNC: 1.03 MG/DL (ref 0.8–1.3)
DIFFERENTIAL METHOD BLD: ABNORMAL
EKG ATRIAL RATE: 101 BPM
EKG DIAGNOSIS: NORMAL
EKG P AXIS: 47 DEGREES
EKG P-R INTERVAL: 166 MS
EKG Q-T INTERVAL: 339 MS
EKG QRS DURATION: 90 MS
EKG QTC CALCULATION (BAZETT): 438 MS
EKG R AXIS: -36 DEGREES
EKG T AXIS: 49 DEGREES
EKG VENTRICULAR RATE: 100 BPM
EOSINOPHIL # BLD: 0.02 K/UL (ref 0–0.8)
EOSINOPHIL NFR BLD: 0.3 % (ref 0.5–7.8)
ERYTHROCYTE [DISTWIDTH] IN BLOOD BY AUTOMATED COUNT: 12.6 % (ref 11.9–14.6)
GLOBULIN SER CALC-MCNC: 2.9 G/DL (ref 2.3–3.5)
GLUCOSE BLD STRIP.AUTO-MCNC: 120 MG/DL (ref 65–100)
GLUCOSE SERPL-MCNC: 117 MG/DL (ref 65–100)
HCT VFR BLD AUTO: 42.2 % (ref 41.1–50.3)
HGB BLD-MCNC: 14.5 G/DL (ref 13.6–17.2)
IMM GRANULOCYTES # BLD AUTO: 0.01 K/UL (ref 0–0.5)
IMM GRANULOCYTES NFR BLD AUTO: 0.1 % (ref 0–5)
INR PPP: 0.9
LYMPHOCYTES # BLD: 2.05 K/UL (ref 0.5–4.6)
LYMPHOCYTES NFR BLD: 29.2 % (ref 13–44)
MCH RBC QN AUTO: 30.7 PG (ref 26.1–32.9)
MCHC RBC AUTO-ENTMCNC: 34.4 G/DL (ref 31.4–35)
MCV RBC AUTO: 89.4 FL (ref 82–102)
MONOCYTES # BLD: 0.32 K/UL (ref 0.1–1.3)
MONOCYTES NFR BLD: 4.6 % (ref 4–12)
NEUTS SEG # BLD: 4.55 K/UL (ref 1.7–8.2)
NEUTS SEG NFR BLD: 64.9 % (ref 43–78)
NRBC # BLD: 0 K/UL (ref 0–0.2)
PLATELET # BLD AUTO: 330 K/UL (ref 150–450)
PMV BLD AUTO: 9.2 FL (ref 9.4–12.3)
POTASSIUM SERPL-SCNC: 4.7 MMOL/L (ref 3.5–5.1)
PROT SERPL-MCNC: 7.6 G/DL (ref 6.3–8.2)
PROTHROMBIN TIME: 12.3 SEC (ref 11.3–14.9)
RBC # BLD AUTO: 4.72 M/UL (ref 4.23–5.6)
SERVICE CMNT-IMP: ABNORMAL
SODIUM SERPL-SCNC: 138 MMOL/L (ref 133–143)
TROPONIN T SERPL HS-MCNC: <6 NG/L (ref 0–22)
TROPONIN T SERPL HS-MCNC: <6 NG/L (ref 0–22)
WBC # BLD AUTO: 7 K/UL (ref 4.3–11.1)

## 2025-06-04 PROCEDURE — 85025 COMPLETE CBC W/AUTO DIFF WBC: CPT

## 2025-06-04 PROCEDURE — 6370000000 HC RX 637 (ALT 250 FOR IP): Performed by: FAMILY MEDICINE

## 2025-06-04 PROCEDURE — 85610 PROTHROMBIN TIME: CPT

## 2025-06-04 PROCEDURE — 6360000004 HC RX CONTRAST MEDICATION: Performed by: EMERGENCY MEDICINE

## 2025-06-04 PROCEDURE — 6370000000 HC RX 637 (ALT 250 FOR IP): Performed by: EMERGENCY MEDICINE

## 2025-06-04 PROCEDURE — 93010 ELECTROCARDIOGRAM REPORT: CPT | Performed by: INTERNAL MEDICINE

## 2025-06-04 PROCEDURE — 80053 COMPREHEN METABOLIC PANEL: CPT

## 2025-06-04 PROCEDURE — 2500000003 HC RX 250 WO HCPCS: Performed by: FAMILY MEDICINE

## 2025-06-04 PROCEDURE — G0378 HOSPITAL OBSERVATION PER HR: HCPCS

## 2025-06-04 PROCEDURE — 70450 CT HEAD/BRAIN W/O DYE: CPT

## 2025-06-04 PROCEDURE — 93005 ELECTROCARDIOGRAM TRACING: CPT | Performed by: EMERGENCY MEDICINE

## 2025-06-04 PROCEDURE — 70498 CT ANGIOGRAPHY NECK: CPT

## 2025-06-04 PROCEDURE — 84484 ASSAY OF TROPONIN QUANT: CPT

## 2025-06-04 PROCEDURE — 82962 GLUCOSE BLOOD TEST: CPT

## 2025-06-04 RX ORDER — ONDANSETRON 4 MG/1
4 TABLET, ORALLY DISINTEGRATING ORAL EVERY 8 HOURS PRN
Status: DISCONTINUED | OUTPATIENT
Start: 2025-06-04 | End: 2025-06-06 | Stop reason: HOSPADM

## 2025-06-04 RX ORDER — ONDANSETRON 2 MG/ML
4 INJECTION INTRAMUSCULAR; INTRAVENOUS EVERY 6 HOURS PRN
Status: DISCONTINUED | OUTPATIENT
Start: 2025-06-04 | End: 2025-06-06 | Stop reason: HOSPADM

## 2025-06-04 RX ORDER — SODIUM CHLORIDE 0.9 % (FLUSH) 0.9 %
5-40 SYRINGE (ML) INJECTION PRN
Status: DISCONTINUED | OUTPATIENT
Start: 2025-06-04 | End: 2025-06-06 | Stop reason: HOSPADM

## 2025-06-04 RX ORDER — SODIUM CHLORIDE 0.9 % (FLUSH) 0.9 %
5-40 SYRINGE (ML) INJECTION EVERY 12 HOURS SCHEDULED
Status: DISCONTINUED | OUTPATIENT
Start: 2025-06-04 | End: 2025-06-06 | Stop reason: HOSPADM

## 2025-06-04 RX ORDER — ENOXAPARIN SODIUM 100 MG/ML
40 INJECTION SUBCUTANEOUS
Status: DISCONTINUED | OUTPATIENT
Start: 2025-06-05 | End: 2025-06-06 | Stop reason: HOSPADM

## 2025-06-04 RX ORDER — ATORVASTATIN CALCIUM 40 MG/1
80 TABLET, FILM COATED ORAL NIGHTLY
Status: DISCONTINUED | OUTPATIENT
Start: 2025-06-04 | End: 2025-06-06 | Stop reason: HOSPADM

## 2025-06-04 RX ORDER — CLOPIDOGREL BISULFATE 75 MG/1
300 TABLET ORAL
Status: COMPLETED | OUTPATIENT
Start: 2025-06-04 | End: 2025-06-04

## 2025-06-04 RX ORDER — LABETALOL HYDROCHLORIDE 5 MG/ML
10 INJECTION, SOLUTION INTRAVENOUS EVERY 10 MIN PRN
Status: DISCONTINUED | OUTPATIENT
Start: 2025-06-04 | End: 2025-06-06 | Stop reason: HOSPADM

## 2025-06-04 RX ORDER — ASPIRIN 81 MG/1
81 TABLET, CHEWABLE ORAL DAILY
Status: DISCONTINUED | OUTPATIENT
Start: 2025-06-05 | End: 2025-06-06 | Stop reason: HOSPADM

## 2025-06-04 RX ORDER — SODIUM CHLORIDE 9 MG/ML
INJECTION, SOLUTION INTRAVENOUS PRN
Status: DISCONTINUED | OUTPATIENT
Start: 2025-06-04 | End: 2025-06-06 | Stop reason: HOSPADM

## 2025-06-04 RX ORDER — IOPAMIDOL 755 MG/ML
60 INJECTION, SOLUTION INTRAVASCULAR
Status: COMPLETED | OUTPATIENT
Start: 2025-06-04 | End: 2025-06-04

## 2025-06-04 RX ORDER — POLYETHYLENE GLYCOL 3350 17 G/17G
17 POWDER, FOR SOLUTION ORAL DAILY PRN
Status: DISCONTINUED | OUTPATIENT
Start: 2025-06-04 | End: 2025-06-06 | Stop reason: HOSPADM

## 2025-06-04 RX ADMIN — SODIUM CHLORIDE, PRESERVATIVE FREE 10 ML: 5 INJECTION INTRAVENOUS at 21:04

## 2025-06-04 RX ADMIN — IOPAMIDOL 60 ML: 755 INJECTION, SOLUTION INTRAVENOUS at 12:08

## 2025-06-04 RX ADMIN — CLOPIDOGREL BISULFATE 300 MG: 75 TABLET, FILM COATED ORAL at 13:04

## 2025-06-04 RX ADMIN — ATORVASTATIN CALCIUM 80 MG: 40 TABLET, FILM COATED ORAL at 20:38

## 2025-06-04 ASSESSMENT — PAIN - FUNCTIONAL ASSESSMENT: PAIN_FUNCTIONAL_ASSESSMENT: NONE - DENIES PAIN

## 2025-06-04 NOTE — ED NOTES
Success  A new connect request was successfully created for:  Gabe Ray  : 1970  MRN: [id number]  ConnectID: [id number]  REASON:  Code Stroke TLKW less than 4.5 hours  ACUITY:  Code Stroke TLKW <4.5  SUBMITTED:  2025 11:31 EDT  CLOSE

## 2025-06-04 NOTE — ED TRIAGE NOTES
Pt ambulatory to triage for reports of high blood pressure & dizziness. Pt states he was at work when he experienced some dizziness & had his blood pressure checked & it read high (169/102 & 173/99). Pt states he feels lightheaded at this time. Pt denies any chest pain but reports \"burning in neck.\" Pt states nurse at his work gave him 4 baby aspirin but he only took 2.

## 2025-06-04 NOTE — H&P
Hospitalist History and Physical   Admit Date:  2025  3:54 PM   Name:  Gabe Ray   Age:  54 y.o.  Sex:  male  :  1970   MRN:  [id number]   Room:  [facility]/    Presenting/Chief Complaint: No chief complaint on file.     Reason(s) for Admission: Dizziness [R42]     History of Present Illness:   Gabe Ray is a 54 y.o. male with medical history of hypertension-was on lisinopril, as per patient was discontinued as his blood pressure was doing good.  Does not have a primary care, has not seen a primary care for almost 3 to 4 years.    Triage  \"Pt ambulatory to triage for reports of high blood pressure & dizziness. Pt states he was at work when he experienced some dizziness & had his blood pressure checked & it read high (169/102 & 173/99). Pt states he feels lightheaded at this time. Pt denies any chest pain but reports \"burning in neck.\" Pt states nurse at his work gave him 4 baby aspirin but he only took 2 \"    As per patient he was at gym doing cycling suddenly notices heart rate around 180-associated shortness of breath and dizziness.  Patient says he stopped cycling, heart rate was still around 180s, says he was still mild short of breath and dizzy feeling but was still able to do couple more workouts before he headed to work.  At work when he was walking noticed again some shortness of breath and dizzy feeling, says also mild left leg weakness-initial blood pressure 169/102-normal heart rate.  The nurse at the facility advised to the patient to go home with rest, patient had a small workup before he could leave and at the end of the work he again went back to the nursing checked his blood pressure 173/99 but at this point he did not have any shortness of breath or dizzy feeling or any tachycardia.    Code S was called at [place] emergency room, patient was evaluated by tele neurology, patient was transferred to [facility] for CVA workup.  Assessment & Plan:

## 2025-06-04 NOTE — PROGRESS NOTES
4 Eyes Skin Assessment     NAME:  Gabe Ray  YOB: 1970  MEDICAL RECORD NUMBER:  [id number]    The patient is being assessed for  Admission    I agree that at least one RN has performed a thorough Head to Toe Skin Assessment on the patient. ALL assessment sites listed below have been assessed.      Areas assessed by both nurses:    Head, Face, Ears, Shoulders, Back, Chest, Arms, Elbows, Hands, Sacrum. Buttock, Coccyx, Ischium, and Legs. Feet and Heels        Does the Patient have a Wound? No noted wound(s)  Ant bite to right upper foot.        Bhavin Prevention initiated by RN: Yes  Wound Care Orders initiated by RN: No    Pressure Injury (Stage 3,4, Unstageable, DTI, NWPT, and Complex wounds) if present, place Wound referral order by RN under : No    New Ostomies, if present place, Ostomy referral order under : No     Nurse 1 eSignature: Electronically signed by Betina Blackman RN on 6/4/25 at 4:14 PM EDT    **SHARE this note so that the co-signing nurse can place an eSignature**    Nurse 2 eSignature: Electronically signed by Gina Oquendo RN on 6/4/25 at 4:24 PM EDT

## 2025-06-04 NOTE — ED NOTES
TRANSFER - OUT REPORT:    Verbal report given to KOLBY Fuentes on Gabe Ray  being transferred to  for routine progression of patient care       Report consisted of patient's Situation, Background, Assessment and   Recommendations(SBAR).     Information from the following report(s) Nurse Handoff Report was reviewed with the receiving nurse.    [place] Fall Assessment:    Presents to emergency department  because of falls (Syncope, seizure, or loss of consciousness): No  Age > 70: No  Altered Mental Status, Intoxication with alcohol or substance confusion (Disorientation, impaired judgment, poor safety awaremess, or inability to follow instructions): No  Impaired Mobility: Ambulates or transfers with assistive devices or assistance; Unable to ambulate or transer.: No  Nursing Judgement: No          Lines:   Peripheral IV 06/04/25 Right Antecubital (Active)   Site Assessment Clean, dry & intact 06/04/25 1143   Line Status Blood return noted;Flushed 06/04/25 1143   Phlebitis Assessment No symptoms 06/04/25 1143   Infiltration Assessment 0 06/04/25 1143        Opportunity for questions and clarification was provided.      Patient transported with:  Monitor

## 2025-06-04 NOTE — PROGRESS NOTES
Physician Signature  This document was electronically signed by: Cisco Colvin MD      Consult Cover Page  FROM: [facility], [phone]  Call Back Number: [phone]  SUBJECT: Consult Recommendations  Date and Time of Report: 06/04/2025 11:59 AM CT  Items Contained in this Document: Neurology Progress Note    Consult Information  Member Facility: [facility]  Facility MRN: [id number]  Visit/Encounter Number: [id number]  Consult ID: [id number]  Facility Time Zone: CT  Date and Time of Request: 06- 11:48 AM CT  Requesting Clinician: Minoo Burns M.D.  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 Information can be limited based upon the interaction.    Spoke with Dr. Burns for patient Nidia Leigh A    Patient seen by me earlier for TIA.    Discussed plans with Dr. Burns as per my prior note. I am told she wants to go home. TIA workup prior to discharge home.    [facility] service can be involved for the further follow ups. I am happy to assist if any further question or assistance is needed.    The attached recommendations are provided in order to facilitate patient care. The tele physician is not available onsite and any questions, additional orders, imaging/lab results/vitals or changes in patient status should be addressed directly with the attending physician not the tele-neurologist. If new question arises after results become available or new symptoms occurs, please call [facility] for

## 2025-06-04 NOTE — PROGRESS NOTES
Stroke treatment brochure was provided to: patient.  Rationale for acute work-up of symptoms explained.  Possible treatments, such as tPA or intervention for ischemic strokes and the need for a quick work-up, have been reviewed.

## 2025-06-04 NOTE — PROGRESS NOTES
TRANSFER - IN REPORT:    Verbal report received from Michael JARRETT on Gabe Ray  being received from [facility] for routine progression of patient care      Report consisted of patient's Situation, Background, Assessment and   Recommendations(SBAR).     Information from the following report(s) Nurse Handoff Report was reviewed with the receiving nurse.    Opportunity for questions and clarification was provided.      Assessment to be completed upon patient's arrival to unit and then care will be assumed.

## 2025-06-04 NOTE — CONSULTS
Physician Signature  This document was electronically signed by: Cisco Colvin MD  2025 11:54 AM    Consult Cover Page  FROM: [facility], [phone]  Call Back Number: [phone]  SUBJECT: Consult Recommendations  Date and Time of Report: 2025 11:54 AM ET  Items Contained in this Document: Neurology Consult Note    Consult Information  Member Facility: [facility] MRN: [id number]  Consult ID: [id number]  Facility Time Zone: ET  Date and Time of Request: 2025 11:31 AM ET  Requesting Clinician: dr. Morris  Patient Name: Gaeb Ray  YOB: 1970  Gender: Male  Patient identity was confirmed at the beginning of the consult with the patient/family/staff using two personal identifiers: Patient name and   Teleneurology Consultant: Cisco Colvin MD    Reason for Consult  Reason for Consult: Code Stroke TLKW less than 4.5 hours    General  Chief Complaint: Pt ambulatory to triage for reports of high blood pressure & dizziness. Pt states he was at work when he experienced some dizziness & had his blood pressure checked & it read high (169/102 & 173/99). Pt states he feels lightheaded at this time. Pt denies any chest pain but reports \"burning in neck.\" Pt states nurse at his work gave him 4 baby aspirin but he only took 2  Patient Location and Admission Status: ED- Patient is not admitted  Family Members and Medical Staff Present During Exam: Nurse  History of Present Illness: Please note - this is a tele neurology consultation. Information can be limited based upon the interaction.    Gabe Cheung is 54 year’s old right-handed  man seen in the ED with his Nurse Michael at the bedside.     Last known well - 930    Patient is himself able to answer most of my questions appropriately; however, he is unable to tell when he started to develop the symptoms. Woke up fine this morning, symptoms started at Gym with shortness of  45) degrees for full 10 seconds.  5b. Motor Right Arm : 0 : No drift; limb holds 90 (or 45) degrees for full 10 seconds.  6a. Motor Left Le :  No drift; leg holds 30-degree position for full 5 seconds.  6b. Motor Right Le :  No drift; leg holds 30-degree position for full 5 seconds.  7. Limb Ataxia: 0 : Absent.  8. Sensory: 0 : Normal; no sensory loss.  9. Best Language: 0 : No aphasia; normal.  10. Dysarthria: 0 : Normal.  11. Extinction and inattention (formerly Neglect) : 0 : No abnormality.  NIH Stroke Scale Entry Time: 2025 11:48:28 AM  ET    Exam  Exam: Appearance: general appearance - pleasant, habitus is normal    Skin: no obvious ischemic changes or significant lesions seen.    Neurological examination:    Mental status:   orientated to time, place, person   attention, concentration normal,   speech and language normal, patient is fluent, able to comprehend with simple 2-3 step commands, able to repeat a sentence, name, read.  fund of knowledge normal    Cranial nerves:   2nd:  visual acuity: visual fields: intact to confrontation; optic discs: not seen  3rd, 4th, 6th: pupillary light reflex normal and extraocular movements full  5th: facial sensation normal; normal strength of masticatory muscles  7th: facial musculature intact.  No facial asymmetry  8th: seems to be hearing fine  9th: symmetrical palatal elevation  10th: phonation normal; palatal elevation symmetrical  11th: sternocleidomastoids able to move  12th: tongue protrusion normal    Tone:   upper limbs - not assessed   lower limbs - not assessed    Power:   upper limbs - seems to be moving fine from both sides  lower limbs - seems to be moving fine from both sides    Reflexes:   upper limbs - not assessed   lower limbs - not assessed  Plantar responses - not assessed    Coordination:   finger-nose-finger normal   heel-shin-heel normal    Sensation:   seems to respond to light touch and pin-prick fine from both sides    Gait and

## 2025-06-04 NOTE — ED PROVIDER NOTES
Emergency Department Provider Note       PCP: Arjun Robert MD   Age: 54 y.o.   Sex: male     DISPOSITION Decision To Transfer 06/04/2025 12:59:48 PM   DISPOSITION CONDITION Stable            ICD-10-CM    1. TIA (transient ischemic attack)  G45.9           Medical Decision Making     Patient has apparent resolution of the symptoms.'s code stroke was initiated due to the physical findings on my exam.  Neurology consultation shows NIHSS of 0.  No indication for thrombolytic therapy.  Head CT and CTA were negative and no other clinically significant abnormalities noted on testing.  Recommendations from neurology was for admission and TIA evaluation.     1 acute illness with systemic symptoms.  Shared medical decision making was utilized in creating the patients health plan today.    I independently ordered and reviewed each unique test.       I interpreted the EKG performed at 11:15 AM shows sinus tachycardia with a rate of 100.  Left axis deviation is present.  No acute ischemic changes or ectopy.    The patient was admitted and I have discussed patient management with the admitting provider.      Critical care procedure note : 30 minutes of critical care time was performed in the emergency department. This was separate from any other procedures listed during the patients emergency department course. The failure to initiate these interventions on an urgent basis would likely have resulted in sudden, clinically significant or life-threatening deterioration in the patients condition.     History     Patient presents to the ER for evaluation with complaints of dizziness and elevated blood pressure.  He states he was at work today when at about 9:30 AM, 2 hours ago, he began feeling dizzy while standing at his workstation.  And dizzy that is described as being a feeling of off balance or disequilibrium.  He denies any vertigo symptoms.  He denied lightheadedness or presyncopal symptoms.  He went to the nurse and  found that his blood pressure was significantly elevated.  He denies any history of treatment for hypertension although he states he has had high blood pressure readings in the past.  He is currently on no medications.  He also notes some weakness to the left upper and left lower extremity associated with the symptoms.  Blood pressure was 173/99 at the nurses office at his place of employment.  156/96 at triage here with some mild tachycardia.  He denies a headache or vision changes.    The history is provided by the patient and medical records.       ROS     Review of Systems   Constitutional:  Negative for chills and fever.   Neurological:  Negative for light-headedness and headaches.   All other systems reviewed and are negative.       Physical Exam     Vitals signs and nursing note reviewed:  Vitals:    06/04/25 1109   BP: (!) 156/96   Pulse: (!) 107   Resp: 19   Temp: 98 °F (36.7 °C)   SpO2: 97%   Weight: 90.7 kg (200 lb)   Height: 1.803 m (5' 11\")      Physical Exam  Vitals and nursing note reviewed.   Constitutional:       General: He is not in acute distress.     Appearance: Normal appearance. He is not ill-appearing or toxic-appearing.   HENT:      Head: Normocephalic and atraumatic.      Nose: Nose normal.      Mouth/Throat:      Mouth: Mucous membranes are moist.      Pharynx: Oropharynx is clear. No oropharyngeal exudate or posterior oropharyngeal erythema.   Eyes:      Extraocular Movements: Extraocular movements intact.      Conjunctiva/sclera: Conjunctivae normal.      Pupils: Pupils are equal, round, and reactive to light.   Cardiovascular:      Rate and Rhythm: Normal rate and regular rhythm.      Heart sounds: Normal heart sounds.   Pulmonary:      Effort: Pulmonary effort is normal.      Breath sounds: Normal breath sounds.   Abdominal:      General: There is no distension.      Palpations: Abdomen is soft.      Tenderness: There is no abdominal tenderness. There is no right CVA tenderness, left

## 2025-06-05 ENCOUNTER — APPOINTMENT (OUTPATIENT)
Dept: ULTRASOUND IMAGING | Age: 55
End: 2025-06-05
Attending: STUDENT IN AN ORGANIZED HEALTH CARE EDUCATION/TRAINING PROGRAM
Payer: COMMERCIAL

## 2025-06-05 ENCOUNTER — APPOINTMENT (OUTPATIENT)
Dept: NON INVASIVE DIAGNOSTICS | Age: 55
End: 2025-06-05
Attending: FAMILY MEDICINE
Payer: COMMERCIAL

## 2025-06-05 ENCOUNTER — APPOINTMENT (OUTPATIENT)
Dept: MRI IMAGING | Age: 55
End: 2025-06-05
Attending: STUDENT IN AN ORGANIZED HEALTH CARE EDUCATION/TRAINING PROGRAM
Payer: COMMERCIAL

## 2025-06-05 PROBLEM — Q21.12 PFO (PATENT FORAMEN OVALE): Status: ACTIVE | Noted: 2025-06-05

## 2025-06-05 PROBLEM — R53.1 WEAKNESS: Status: ACTIVE | Noted: 2025-06-05

## 2025-06-05 LAB
ANION GAP SERPL CALC-SCNC: 13 MMOL/L (ref 7–16)
BUN SERPL-MCNC: 15 MG/DL (ref 6–23)
CALCIUM SERPL-MCNC: 9.2 MG/DL (ref 8.8–10.2)
CHLORIDE SERPL-SCNC: 103 MMOL/L (ref 98–107)
CHOLEST SERPL-MCNC: 196 MG/DL (ref 0–200)
CO2 SERPL-SCNC: 22 MMOL/L (ref 20–29)
CREAT SERPL-MCNC: 1.07 MG/DL (ref 0.8–1.3)
ECHO AO ASC DIAM: 4.3 CM
ECHO AO ASCENDING AORTA INDEX: 2.03 CM/M2
ECHO AO ROOT DIAM: 3.9 CM
ECHO AO ROOT INDEX: 1.84 CM/M2
ECHO AV AREA PEAK VELOCITY: 4.8 CM2
ECHO AV AREA VTI: 4.7 CM2
ECHO AV AREA/BSA PEAK VELOCITY: 2.3 CM2/M2
ECHO AV AREA/BSA VTI: 2.2 CM2/M2
ECHO AV MEAN GRADIENT: 3 MMHG
ECHO AV MEAN VELOCITY: 0.8 M/S
ECHO AV PEAK GRADIENT: 4 MMHG
ECHO AV PEAK GRADIENT: 4 MMHG
ECHO AV PEAK VELOCITY: 1 M/S
ECHO AV VELOCITY RATIO: 0.9
ECHO AV VTI: 19.5 CM
ECHO BSA: 2.15 M2
ECHO EST RA PRESSURE: 3 MMHG
ECHO IVC PROX: 1.5 CM
ECHO LA AREA 2C: 18.3 CM2
ECHO LA AREA 4C: 16.9 CM2
ECHO LA DIAMETER INDEX: 2.03 CM/M2
ECHO LA DIAMETER: 4.3 CM
ECHO LA MAJOR AXIS: 5.4 CM
ECHO LA MINOR AXIS: 5.2 CM
ECHO LA TO AORTIC ROOT RATIO: 1.1
ECHO LA VOL BP: 49 ML (ref 18–58)
ECHO LA VOL MOD A2C: 52 ML (ref 18–58)
ECHO LA VOL MOD A4C: 44 ML (ref 18–58)
ECHO LA VOL/BSA BIPLANE: 23 ML/M2 (ref 16–34)
ECHO LA VOLUME INDEX MOD A2C: 25 ML/M2 (ref 16–34)
ECHO LA VOLUME INDEX MOD A4C: 21 ML/M2 (ref 16–34)
ECHO LV E' LATERAL VELOCITY: 11 CM/S
ECHO LV E' SEPTAL VELOCITY: 6.09 CM/S
ECHO LV EDV A2C: 135 ML
ECHO LV EDV A4C: 131 ML
ECHO LV EDV INDEX A4C: 62 ML/M2
ECHO LV EDV NDEX A2C: 64 ML/M2
ECHO LV EF PHYSICIAN: 50 %
ECHO LV EJECTION FRACTION A2C: 51 %
ECHO LV EJECTION FRACTION A4C: 46 %
ECHO LV EJECTION FRACTION BIPLANE: 48 % (ref 55–100)
ECHO LV ESV A2C: 66 ML
ECHO LV ESV A4C: 71 ML
ECHO LV ESV INDEX A2C: 31 ML/M2
ECHO LV ESV INDEX A4C: 33 ML/M2
ECHO LV FRACTIONAL SHORTENING: 25 % (ref 28–44)
ECHO LV INTERNAL DIMENSION DIASTOLE INDEX: 2.08 CM/M2
ECHO LV INTERNAL DIMENSION DIASTOLIC: 4.4 CM (ref 4.2–5.9)
ECHO LV INTERNAL DIMENSION SYSTOLIC INDEX: 1.56 CM/M2
ECHO LV INTERNAL DIMENSION SYSTOLIC: 3.3 CM
ECHO LV IVSD: 1.3 CM (ref 0.6–1)
ECHO LV MASS 2D: 215.1 G (ref 88–224)
ECHO LV MASS INDEX 2D: 101.5 G/M2 (ref 49–115)
ECHO LV POSTERIOR WALL DIASTOLIC: 1.3 CM (ref 0.6–1)
ECHO LV RELATIVE WALL THICKNESS RATIO: 0.59
ECHO LVOT AREA: 5.3 CM2
ECHO LVOT AV VTI INDEX: 0.86
ECHO LVOT DIAM: 2.6 CM
ECHO LVOT MEAN GRADIENT: 2 MMHG
ECHO LVOT PEAK GRADIENT: 3 MMHG
ECHO LVOT PEAK VELOCITY: 0.9 M/S
ECHO LVOT STROKE VOLUME INDEX: 41.8 ML/M2
ECHO LVOT SV: 88.6 ML
ECHO LVOT VTI: 16.7 CM
ECHO MV A VELOCITY: 0.78 M/S
ECHO MV AREA VTI: 4.3 CM2
ECHO MV E DECELERATION TIME (DT): 328 MS
ECHO MV E VELOCITY: 0.56 M/S
ECHO MV E/A RATIO: 0.72
ECHO MV E/E' LATERAL: 5.09
ECHO MV E/E' RATIO (AVERAGED): 7.14
ECHO MV E/E' SEPTAL: 9.2
ECHO MV LVOT VTI INDEX: 1.22
ECHO MV MAX VELOCITY: 0.8 M/S
ECHO MV MEAN GRADIENT: 1 MMHG
ECHO MV MEAN VELOCITY: 0.5 M/S
ECHO MV PEAK GRADIENT: 3 MMHG
ECHO MV VTI: 20.4 CM
ECHO PULMONARY ARTERY END DIASTOLIC PRESSURE: 4 MMHG
ECHO PV ACCELERATION TIME (AT): 82 MS
ECHO PV MAX VELOCITY: 1 M/S
ECHO PV PEAK GRADIENT: 4 MMHG
ECHO PV REGURGITANT END DIASTOLIC MAX VELOCITY: 1 M/S
ECHO RIGHT VENTRICULAR SYSTOLIC PRESSURE (RVSP): 22 MMHG
ECHO RV BASAL DIMENSION: 3.8 CM
ECHO RV FREE WALL PEAK S': 11.3 CM/S
ECHO RV TAPSE: 2.1 CM (ref 1.7–?)
ECHO TV REGURGITANT MAX VELOCITY: 2.17 M/S
ECHO TV REGURGITANT PEAK GRADIENT: 19 MMHG
ECHO TV REGURGITANT PEAK GRADIENT: 19 MMHG
ERYTHROCYTE [DISTWIDTH] IN BLOOD BY AUTOMATED COUNT: 13.2 % (ref 11.9–14.6)
EST. AVERAGE GLUCOSE BLD GHB EST-MCNC: 115 MG/DL
GLUCOSE SERPL-MCNC: 106 MG/DL (ref 70–99)
HBA1C MFR BLD: 5.6 % (ref 0–5.6)
HCT VFR BLD AUTO: 41.3 % (ref 41.1–50.3)
HDLC SERPL-MCNC: 50 MG/DL (ref 40–60)
HDLC SERPL: 3.9 (ref 0–5)
HGB BLD-MCNC: 14.5 G/DL (ref 13.6–17.2)
LDLC SERPL CALC-MCNC: 126 MG/DL (ref 0–100)
MCH RBC QN AUTO: 31.2 PG (ref 26.1–32.9)
MCHC RBC AUTO-ENTMCNC: 35.1 G/DL (ref 31.4–35)
MCV RBC AUTO: 88.8 FL (ref 82–102)
NRBC # BLD: 0 K/UL (ref 0–0.2)
PLATELET # BLD AUTO: 317 K/UL (ref 150–450)
PMV BLD AUTO: 9.6 FL (ref 9.4–12.3)
POTASSIUM SERPL-SCNC: 4 MMOL/L (ref 3.5–5.1)
RBC # BLD AUTO: 4.65 M/UL (ref 4.23–5.6)
SODIUM SERPL-SCNC: 138 MMOL/L (ref 136–145)
TRIGL SERPL-MCNC: 99 MG/DL (ref 0–150)
VLDLC SERPL CALC-MCNC: 20 MG/DL (ref 6–23)
WBC # BLD AUTO: 6.7 K/UL (ref 4.3–11.1)

## 2025-06-05 PROCEDURE — 80048 BASIC METABOLIC PNL TOTAL CA: CPT

## 2025-06-05 PROCEDURE — 97535 SELF CARE MNGMENT TRAINING: CPT

## 2025-06-05 PROCEDURE — 99222 1ST HOSP IP/OBS MODERATE 55: CPT | Performed by: INTERNAL MEDICINE

## 2025-06-05 PROCEDURE — 70551 MRI BRAIN STEM W/O DYE: CPT

## 2025-06-05 PROCEDURE — 6370000000 HC RX 637 (ALT 250 FOR IP): Performed by: FAMILY MEDICINE

## 2025-06-05 PROCEDURE — 97165 OT EVAL LOW COMPLEX 30 MIN: CPT

## 2025-06-05 PROCEDURE — 36415 COLL VENOUS BLD VENIPUNCTURE: CPT

## 2025-06-05 PROCEDURE — G0378 HOSPITAL OBSERVATION PER HR: HCPCS

## 2025-06-05 PROCEDURE — 93306 TTE W/DOPPLER COMPLETE: CPT | Performed by: INTERNAL MEDICINE

## 2025-06-05 PROCEDURE — 83036 HEMOGLOBIN GLYCOSYLATED A1C: CPT

## 2025-06-05 PROCEDURE — 96372 THER/PROPH/DIAG INJ SC/IM: CPT

## 2025-06-05 PROCEDURE — 6360000002 HC RX W HCPCS: Performed by: FAMILY MEDICINE

## 2025-06-05 PROCEDURE — 2500000003 HC RX 250 WO HCPCS: Performed by: FAMILY MEDICINE

## 2025-06-05 PROCEDURE — 85027 COMPLETE CBC AUTOMATED: CPT

## 2025-06-05 PROCEDURE — 97112 NEUROMUSCULAR REEDUCATION: CPT

## 2025-06-05 PROCEDURE — 99222 1ST HOSP IP/OBS MODERATE 55: CPT | Performed by: PSYCHIATRY & NEUROLOGY

## 2025-06-05 PROCEDURE — 93970 EXTREMITY STUDY: CPT

## 2025-06-05 PROCEDURE — 93306 TTE W/DOPPLER COMPLETE: CPT

## 2025-06-05 PROCEDURE — 80061 LIPID PANEL: CPT

## 2025-06-05 PROCEDURE — 6370000000 HC RX 637 (ALT 250 FOR IP): Performed by: HOSPITALIST

## 2025-06-05 PROCEDURE — 97161 PT EVAL LOW COMPLEX 20 MIN: CPT

## 2025-06-05 RX ORDER — GADOTERIDOL 279.3 MG/ML
20 INJECTION INTRAVENOUS
Status: DISCONTINUED | OUTPATIENT
Start: 2025-06-05 | End: 2025-06-06 | Stop reason: HOSPADM

## 2025-06-05 RX ORDER — CLOPIDOGREL BISULFATE 75 MG/1
75 TABLET ORAL DAILY
Status: DISCONTINUED | OUTPATIENT
Start: 2025-06-05 | End: 2025-06-06

## 2025-06-05 RX ORDER — AMLODIPINE BESYLATE 5 MG/1
5 TABLET ORAL DAILY
Status: DISCONTINUED | OUTPATIENT
Start: 2025-06-05 | End: 2025-06-05

## 2025-06-05 RX ADMIN — CLOPIDOGREL 75 MG: 75 TABLET, FILM COATED ORAL at 09:40

## 2025-06-05 RX ADMIN — SODIUM CHLORIDE, PRESERVATIVE FREE 10 ML: 5 INJECTION INTRAVENOUS at 09:40

## 2025-06-05 RX ADMIN — ASPIRIN 81 MG: 81 TABLET, CHEWABLE ORAL at 09:40

## 2025-06-05 RX ADMIN — ENOXAPARIN SODIUM 40 MG: 100 INJECTION SUBCUTANEOUS at 09:40

## 2025-06-05 RX ADMIN — SODIUM CHLORIDE, PRESERVATIVE FREE 10 ML: 5 INJECTION INTRAVENOUS at 20:36

## 2025-06-05 RX ADMIN — ATORVASTATIN CALCIUM 80 MG: 40 TABLET, FILM COATED ORAL at 20:36

## 2025-06-05 ASSESSMENT — ENCOUNTER SYMPTOMS
GASTROINTESTINAL NEGATIVE: 1
SHORTNESS OF BREATH: 1
EYES NEGATIVE: 1
ALLERGIC/IMMUNOLOGIC NEGATIVE: 1

## 2025-06-05 NOTE — PROGRESS NOTES
ACUTE PHYSICAL THERAPY GOALS:   (Developed with and agreed upon by patient and/or caregiver.)  No goals set, patient functioning at baseline.    PHYSICAL THERAPY Initial Assessment and Discharge  (Link to Caseload Tracking:    Acknowledge Orders  Time In/Out  PT Charge Capture  Rehab Caseload Tracker    Gabe Ray is a 54 y.o. male   PRIMARY DIAGNOSIS: Dizziness  Dizziness [R42]       Reason for Referral: Other abnormalities of gait and mobility (R26.89)  Observation: Payor: ANGELA / Plan: [employer] OPEN ACCESS PLUS (OAP) / Product Type: *No Product type* /     ASSESSMENT:     REHAB RECOMMENDATIONS:   Recommendation to date pending progress:  Setting:  No further skilled physical therapy after discharge from hospital    Equipment:    None     ASSESSMENT:  Mr. Ray was admitted due to above.  He also had some L weakness, so MD was working up for possible CVA.  Patient demonstrated all mobility independently.  B LE's strength WFL and equal.  Patient appears to be functioning at baseline, no skilled PT indicated..     [employer]™ “6 Clicks” Basic Mobility Inpatient Short Form  AM-PAC Basic Mobility - Inpatient   How much help is needed turning from your back to your side while in a flat bed without using bedrails?: None  How much help is needed moving from lying on your back to sitting on the side of a flat bed without using bedrails?: None  How much help is needed moving to and from a bed to a chair?: None  How much help is needed standing up from a chair using your arms?: None  How much help is needed walking in hospital room?: None  How much help is needed climbing 3-5 steps with a railing?: None  [facility] Inpatient Mobility Raw Score : 24  AM-PAC Inpatient T-Scale Score : 61.14  Mobility Inpatient CMS 0-100% Score: 0  Mobility Inpatient CMS G-Code Modifier : CH    SUBJECTIVE:   Mr. Ray states, \"Just the high blood pressure.\"     Social/Functional Lives With: Alone  Type of Home: House  Home  Total=Total Assistance, NT=Not Tested    PLAN:   FREQUENCY AND DURATION:   for duration of hospital stay or until stated goals are met, whichever comes first.    THERAPY PROGNOSIS: Excellent    PROBLEM LIST:   (Skilled intervention is medically necessary to address:)  None new INTERVENTIONS PLANNED:   (Benefits and precautions of physical therapy have been discussed with the patient.)  Education       TREATMENT:   EVALUATION: LOW COMPLEXITY: (Untimed Charge)  The initial evaluation charge encompasses clinical chart review, objective assessment, interpretation of assessment, and skilled monitoring of the patient's response to treatment in order to develop a plan of care.     TREATMENT:        TREATMENT GRID:  N/A    AFTER TREATMENT PRECAUTIONS: Bed, Bed/Chair Locked, Call light within reach, Needs within reach, and Visitors at bedside    INTERDISCIPLINARY COLLABORATION:  RN/ PCT and PT/ PTA    EDUCATION: Education Given To: Patient  Education Provided: Role of Therapy;Plan of Care  Education Outcome: Verbalized understanding  Educated patient and/or family/caregiver on the following: NEWA.S.T    TIME IN/OUT:  Time In: 1119  Time Out: 1141  Minutes: 22    A Melanie Melendez PT

## 2025-06-05 NOTE — CONSULTS
Neurology Consult Note       History: This is a 54-year-old man who presents with lightheadedness and subtle abnormalities on the left side of the body.  A couple of days ago, the patient was loading hay into the back of his truck and felt his heart racing.  He was then exercising and his heart rate got into the low 180s.  With both of these episodes he became short of breath.  With the second episode he had significant lightheadedness.  Because the patient had some subtle abnormalities on the left side of the body he was admitted for stroke/TIA rule out.  Currently, he feels back to normal.  He also states that he had generalized weakness during these episodes.      Exam: Pertinent positives and negatives include:    General - Well developed, well nourished, in no apparent distress. Pleasant and conversant.   HEENT - Normocephalic, atraumatic.  Neck -No masses   Lungs - Normal work of breathing   Abdomen - No masses   Extremities - No edema and no rashes.   Psychiatric - Mood and affect are normal    Neurological examination - Comprehension, attention , memory and reasoning are intact. Language and speech are normal. On cranial nerve examination pupils are equal round and reactive to light (cranial nerve II and III).  Visual acuity is adequate (cranial nerve II). Visual fields are full to finger confrontation (CN II). Extraocular motility is normal (CN III, IV, VI). Face is symmetric (CN VII). Hearing is grossly intact to verbal communication (CN VIII). Motor examination - There is normal bulk. Power is full throughout on the right.  On the left he has very subtle weakness with slight pronation of the hands on the left and relative weakness of the left leg. Cerebellar examination is normal with finger-no-nose testing. Gait and stance are normal.    I reviewed the patient's CT and CTA.  No acute intracranial abnormalities        Assessment and Plan: 54-year-old man with lightheadedness in the setting of tachycardia

## 2025-06-05 NOTE — PROGRESS NOTES
Hospitalist Progress Note   Admit Date:  2025  3:54 PM   Name:  Gabe Ray   Age:  54 y.o.  Sex:  male  :  1970   MRN:  [id number]   Room:  [facility]/    Presenting/Chief Complaint: No chief complaint on file.     Reason(s) for Admission: Dizziness [R42]     Hospital Course:   Gabe Ray is a 54 y.o. male with medical history of hypertension, presented to the ER with dizziness and left-sided weakness.  Patient reports that he has hypertension for which he was previously on lisinopril which was discontinued as he was having cough with the medication.  He was advised experiencing dizziness.  Blood pressure was checked and was high 169/102 to 173/99.  He was brought to the ER.  Code stroke was called in the ER.  Stat CT head and CTA head and neck were done which were negative.  He was eval by teleneurology.  tPA was not recommended due to low NIH stroke scale score inpatient and symptoms improved.    Subjective & 24hr Events:   Patient is resting in bed.  Reports improvement in dizziness.  Still has some left-sided weakness.  No fever no chills.  No chest pain or shortness of breath.      Assessment & Plan:     This is a 54-year-old male with:    Stroke symptoms  CT head and CTA head and neck negative for large vessel occlusion.  MRI of the brain was done this morning which shows chronic microangiopathy.    No evidence of acute ischemic stroke.  Neurology on board.  Appreciate recommendations.  Continue aspirin, Plavix, statin.  May need dual antiplatelet therapy for 3 weeks followed by aspirin.   Echocardiogram was done today which shows PFO, mildly reduced left ventricular ejection fraction of 50 to 55%.  Per neurology, patient lab workup outpatient cardiac monitor/loop recorder.  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    PFO  Echocardiogram done this morning shows PFO, mildly reduced left  Time: 06/04/25 11:37 AM   Result Value Ref Range    Sodium 138 133 - 143 mmol/L    Potassium 4.7 3.5 - 5.1 mmol/L    Chloride 101 98 - 107 mmol/L    CO2 25 20 - 29 mmol/L    Anion Gap 12 7 - 16 mmol/L    Glucose 117 (H) 65 - 100 mg/dL    BUN 17 6 - 23 MG/DL    Creatinine 1.03 0.80 - 1.30 MG/DL    Est, Glom Filt Rate 87 >60 ml/min/1.73m2    Calcium 9.8 8.8 - 10.2 MG/DL    Total Bilirubin 0.5 0.0 - 1.2 MG/DL    ALT 23 12 - 65 U/L    AST 25 15 - 37 U/L    Alk Phosphatase 61 40 - 129 U/L    Total Protein 7.6 6.3 - 8.2 g/dL    Albumin 4.7 3.5 - 5.0 g/dL    Globulin 2.9 2.3 - 3.5 g/dL    Albumin/Globulin Ratio 1.6 1.0 - 1.9     Troponin now then q90 min for 2 occurances    Collection Time: 06/04/25 11:37 AM   Result Value Ref Range    Troponin T <6.0 0 - 22 ng/L   Protime-INR    Collection Time: 06/04/25 11:37 AM   Result Value Ref Range    Protime 12.3 11.3 - 14.9 sec    INR 0.9     POCT Glucose    Collection Time: 06/04/25 11:38 AM   Result Value Ref Range    POC Glucose 120 (H) 65 - 100 mg/dL    Performed by: Leonor    Troponin now then q90 min for 2 occurances    Collection Time: 06/04/25  1:05 PM   Result Value Ref Range    Troponin T <6.0 0 - 22 ng/L   Basic Metabolic Panel w/ Reflex to MG    Collection Time: 06/05/25  4:14 AM   Result Value Ref Range    Sodium 138 136 - 145 mmol/L    Potassium 4.0 3.5 - 5.1 mmol/L    Chloride 103 98 - 107 mmol/L    CO2 22 20 - 29 mmol/L    Anion Gap 13 7 - 16 mmol/L    Glucose 106 (H) 70 - 99 mg/dL    BUN 15 6 - 23 MG/DL    Creatinine 1.07 0.80 - 1.30 MG/DL    Est, Glom Filt Rate 83 >60 ml/min/1.73m2    Calcium 9.2 8.8 - 10.2 MG/DL   CBC    Collection Time: 06/05/25  4:14 AM   Result Value Ref Range    WBC 6.7 4.3 - 11.1 K/uL    RBC 4.65 4.23 - 5.6 M/uL    Hemoglobin 14.5 13.6 - 17.2 g/dL    Hematocrit 41.3 41.1 - 50.3 %    MCV 88.8 82 - 102 FL    MCH 31.2 26.1 - 32.9 PG    MCHC 35.1 (H) 31.4 - 35.0 g/dL    RDW 13.2 11.9 - 14.6 %    Platelets 317 150 - 450 K/uL    MPV 9.6

## 2025-06-05 NOTE — THERAPY EVALUATION
ACUTE OCCUPATIONAL THERAPY GOALS:   (Developed with and agreed upon by patient and/or caregiver.)  1. Patient will complete full body  dressing with independence using B UEs.   2. Patient will complete toileting with independence.   3. Patient will tolerate 10 minutes of OT treatment with no rest breaks to increase activity tolerance for ADLs.   4. Patient will complete functional transfers with independence and no LOB.   5. Patient will complete grooming with independence at sink level in standing.    Timeframe:1 visit - ALL GOALS MET AT IE 6/5/2025, back to Ind BL, no OT Needs, DC OT    OCCUPATIONAL THERAPY Initial Assessment, Daily Note, Discharge, and AM       OT Visit Days: 1  Acknowledge Orders  Time  OT Charge Capture  Rehab Caseload Tracker      Gabe Ray is a 54 y.o. male   PRIMARY DIAGNOSIS: Dizziness  Dizziness [R42]       Reason for Referral: Generalized Muscle Weakness (M62.81)  Other lack of cordination (R27.8)  Difficulty in walking, Not elsewhere classified (R26.2)  Dizziness and Giddiness (R42)  Observation: Payor: [employer] / Plan: [employer] OPEN ACCESS PLUS (OAP) / Product Type: *No Product type* /     ASSESSMENT:     REHAB RECOMMENDATIONS:   Recommendation to date pending progress:  Setting:  No further skilled occupational therapy after discharge from hospital    Equipment:    None     ASSESSMENT:  Mr. Ray is a 54 y.o. 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 Relevant past medical history includes HTN, h/o prostate cancer, no PCP in several years.  At baseline, pt lives alone in a 2 level old farmhouse on property with several large animals with 6 steps B HRs at entrance. Bed and bathrooms are 15 steep stairs with R HR. Pt is normally INDEPENDENT with ADLs.  Pt is normally INDEPENDENT with      EDUCATION:  OT educated patient  on role of occupational therapy and plan of care, OT discharge recommendations, energy conservation techniques to use during ADL/IADL, relaxation techniques, home exercise program, home safety modifications, fall prevention strategies , proper body mechanics, and warning signs and symptoms of stroke. Patient will not need continued education at next session.         TOTAL TREATMENT DURATION AND TIME:  Time In: 0710  Time Out: 0735  Minutes: 25    STEW SOMMER MS, OTR/L

## 2025-06-05 NOTE — CARE COORDINATION
Pt chart reviewed for discharge planning.  LMSW met with pt and sister at bedside, verified demographic information/health insurance.  Pt lives alone and reports supportive family and friends.  PCP was confirmed.  Pt reports that he is normally independent with ADL's. And anticipates no supportive care needs at this time.  Pt is for discharge home today with family and no needs/supportive care orders recieved for LMSW at this time.     06/05/25 1412   Service Assessment   Patient Orientation Alert and Oriented   Cognition Alert   History Provided By Patient;Medical Record   Primary Caregiver Self   Support Systems Family Members;Friends/Neighbors   Patient's Healthcare Decision Maker is: Legal Next of Kin   PCP Verified by CM Yes   Last Visit to PCP Within last year   Prior Functional Level Independent in ADLs/IADLs   Current Functional Level Independent in ADLs/IADLs   Can patient return to prior living arrangement Yes   Ability to make needs known: Good   Family able to assist with home care needs: Yes   Would you like for me to discuss the discharge plan with any other family members/significant others, and if so, who? No   Financial Resources Other (Comment)  ([employer]   Community Resources None   CM/SW Referral Other (see comment)  (D/C planning due to TIA/CVA work up)   Social/Functional History   Lives With Alone   Type of Home House   Home Layout Two level   Home Equipment None   Prior Level of Assist for Transfers Independent   Occupation Full time employment   Discharge Planning   Type of Residence House   Living Arrangements Alone   Current Services Prior To Admission None   Potential Assistance Needed N/A   DME Ordered? No   Potential Assistance Purchasing Medications No   Type of Home Care Services None   Patient expects to be discharged to: House   Services At/After Discharge   Transition of Care Consult (CM Consult) Discharge Planning   Services At/After Discharge None    Resource  Information Provided? No   Mode of Transport at Discharge Other (see comment)  (family)   Confirm Follow Up Transport Self   Condition of Participation: Discharge Planning   The Plan for Transition of Care is related to the following treatment goals: Pt will return home at his functional baseline.   The Patient and/Or Patient Representative agree with the Discharge Plan? Yes

## 2025-06-05 NOTE — PROGRESS NOTES
SPEECH LANGUAGE PATHOLOGY: ATTEMPT     NAME: Gabe Ray  : 1970  MRN: [id number]    ADMISSION DATE: 2025  PRIMARY DIAGNOSIS: Dizziness    Speech Therapy attempted. Patient denies acute changes to speech, swallow, or cognitive-linguistic status. KOLBY Wood reports he is tolerating currently ordered diet well.  Provided education for purpose of speech therapy evaluation, patient politely declining assessment as he is at baseline in speech therapy related domains. Speech therapy will sign off per patient request, thank you!     EKTA AYON, SLP  2025 8:56 AM

## 2025-06-05 NOTE — PROGRESS NOTES
Pt resting in bed. Hourly rounds completed. Pt denies any needs at this time. Pt's bed low and locked. Call light and personal items within pt's reach. Will give report to oncoming night shift nurse.

## 2025-06-05 NOTE — PROGRESS NOTES
Received report at bedside. Pt is resting comfortably at this time. Pt denies any needs at this time.    All needs met throughout shift. Pt consistently score 0 on his NIHSS checks throughout the night.

## 2025-06-05 NOTE — CONSULTS
[facility] Cardiology Initial Cardiac Evaluation                Date of  Admission: 2025  3:54 PM     PCP: Arjun Robert MD  Requested by: Dr. Camara  Primary Cardiologist: none  APC Attending: Dr. Jarrett    Reason for Evaluation: Stroke symptoms with PFO on echo    Gabe Ray is a 54 y.o. male with hx of HTN and ED who was cycling at the gym and noted his HR was 180s, unrelieved by rest. Gym worker encouraged him to go home and rest, HR continued to be 180s at home. At the [place] [facility] he had lightheadedness, dizziness, SOB, left sided weakness, with a BP of 169/102, and \"burning\" in the side of his neck. A CODE S was called and CT/CTA negative. He was transferred downtown for a thorough CVA workup. He reported a similar episode of a high HR at home on 6/3 while lifting a bale of hay, but recovered with rest. He is symptom free at this time. Previously on Lisinopril for HTN but stopped due to cough and normal BP on follow up with PCP.     In ER EKG , /104. Left sided weakness. K 4.0, WBC 6.7, Hgb 14.5, plt 317, Cr 1.07. Echo with EF 50-55% and PFO.     On exam he is calm and pleasant. Denies any left sided weakness, SOB, CP, lightheadedness or dizziness. Stable VSS on RA.     Recent Cardiac Synopsis    LHC/NST: none    Echo:    Left Ventricle: Low normal left ventricular systolic function with a visually estimated EF of 50 - 55%. Left ventricle size is normal. Mildly increased wall thickness. Findings consistent with concentric remodeling. Normal wall motion. Normal diastolic function.    Right Ventricle: Right ventricle size is normal. Normal systolic function.    Interatrial Septum: Grade III Positive (>20 bubbles). Agitated saline study was positive without provocation.    Aorta: Mildly dilated aortic root. Ao root diameter is 3.9 cm. Mildly dilated ascending aorta. Ao ascending diameter is 4.3 cm.     [id number]      Past Medical History:   Diagnosis Date    COVID-19     x 3last time

## 2025-06-06 VITALS
OXYGEN SATURATION: 97 % | TEMPERATURE: 98.1 F | RESPIRATION RATE: 18 BRPM | HEIGHT: 71 IN | BODY MASS INDEX: 28.38 KG/M2 | HEART RATE: 79 BPM | SYSTOLIC BLOOD PRESSURE: 123 MMHG | DIASTOLIC BLOOD PRESSURE: 92 MMHG | WEIGHT: 202.7 LBS

## 2025-06-06 LAB
ANION GAP SERPL CALC-SCNC: 10 MMOL/L (ref 7–16)
BASOPHILS # BLD: 0.06 K/UL (ref 0–0.2)
BASOPHILS NFR BLD: 0.9 % (ref 0–2)
BUN SERPL-MCNC: 17 MG/DL (ref 6–23)
CALCIUM SERPL-MCNC: 9.1 MG/DL (ref 8.8–10.2)
CHLORIDE SERPL-SCNC: 103 MMOL/L (ref 98–107)
CO2 SERPL-SCNC: 23 MMOL/L (ref 20–29)
CREAT SERPL-MCNC: 1.08 MG/DL (ref 0.8–1.3)
DIFFERENTIAL METHOD BLD: ABNORMAL
EOSINOPHIL # BLD: 0.24 K/UL (ref 0–0.8)
EOSINOPHIL NFR BLD: 3.5 % (ref 0.5–7.8)
ERYTHROCYTE [DISTWIDTH] IN BLOOD BY AUTOMATED COUNT: 12.8 % (ref 11.9–14.6)
GLUCOSE SERPL-MCNC: 108 MG/DL (ref 70–99)
HCT VFR BLD AUTO: 41.6 % (ref 41.1–50.3)
HGB BLD-MCNC: 14.2 G/DL (ref 13.6–17.2)
IMM GRANULOCYTES # BLD AUTO: 0.03 K/UL (ref 0–0.5)
IMM GRANULOCYTES NFR BLD AUTO: 0.4 % (ref 0–5)
LYMPHOCYTES # BLD: 3.36 K/UL (ref 0.5–4.6)
LYMPHOCYTES NFR BLD: 48.3 % (ref 13–44)
MCH RBC QN AUTO: 31.3 PG (ref 26.1–32.9)
MCHC RBC AUTO-ENTMCNC: 34.1 G/DL (ref 31.4–35)
MCV RBC AUTO: 91.8 FL (ref 82–102)
MONOCYTES # BLD: 0.5 K/UL (ref 0.1–1.3)
MONOCYTES NFR BLD: 7.2 % (ref 4–12)
NEUTS SEG # BLD: 2.76 K/UL (ref 1.7–8.2)
NEUTS SEG NFR BLD: 39.7 % (ref 43–78)
NRBC # BLD: 0 K/UL (ref 0–0.2)
PLATELET # BLD AUTO: 290 K/UL (ref 150–450)
PMV BLD AUTO: 9.5 FL (ref 9.4–12.3)
POTASSIUM SERPL-SCNC: 4.2 MMOL/L (ref 3.5–5.1)
RBC # BLD AUTO: 4.53 M/UL (ref 4.23–5.6)
SODIUM SERPL-SCNC: 136 MMOL/L (ref 136–145)
WBC # BLD AUTO: 7 K/UL (ref 4.3–11.1)

## 2025-06-06 PROCEDURE — 6370000000 HC RX 637 (ALT 250 FOR IP): Performed by: FAMILY MEDICINE

## 2025-06-06 PROCEDURE — 2500000003 HC RX 250 WO HCPCS: Performed by: FAMILY MEDICINE

## 2025-06-06 PROCEDURE — 99232 SBSQ HOSP IP/OBS MODERATE 35: CPT | Performed by: PSYCHIATRY & NEUROLOGY

## 2025-06-06 PROCEDURE — 6370000000 HC RX 637 (ALT 250 FOR IP): Performed by: HOSPITALIST

## 2025-06-06 PROCEDURE — 93970 EXTREMITY STUDY: CPT | Performed by: RADIOLOGY

## 2025-06-06 PROCEDURE — G0378 HOSPITAL OBSERVATION PER HR: HCPCS

## 2025-06-06 PROCEDURE — 36415 COLL VENOUS BLD VENIPUNCTURE: CPT

## 2025-06-06 PROCEDURE — 80048 BASIC METABOLIC PNL TOTAL CA: CPT

## 2025-06-06 PROCEDURE — 6360000002 HC RX W HCPCS: Performed by: FAMILY MEDICINE

## 2025-06-06 PROCEDURE — 96372 THER/PROPH/DIAG INJ SC/IM: CPT

## 2025-06-06 PROCEDURE — 85025 COMPLETE CBC W/AUTO DIFF WBC: CPT

## 2025-06-06 RX ORDER — AMLODIPINE BESYLATE 5 MG/1
5 TABLET ORAL DAILY
Qty: 30 TABLET | Refills: 0 | Status: SHIPPED | OUTPATIENT
Start: 2025-06-07 | End: 2025-06-11 | Stop reason: SDUPTHER

## 2025-06-06 RX ORDER — ATORVASTATIN CALCIUM 80 MG/1
80 TABLET, FILM COATED ORAL NIGHTLY
Qty: 30 TABLET | Refills: 0 | Status: SHIPPED | OUTPATIENT
Start: 2025-06-06 | End: 2025-06-11 | Stop reason: SDUPTHER

## 2025-06-06 RX ORDER — ASPIRIN 81 MG/1
81 TABLET, CHEWABLE ORAL DAILY
Qty: 30 TABLET | Refills: 0 | Status: SHIPPED | OUTPATIENT
Start: 2025-06-07 | End: 2025-06-11 | Stop reason: SDUPTHER

## 2025-06-06 RX ORDER — CLOPIDOGREL BISULFATE 75 MG/1
75 TABLET ORAL DAILY
Qty: 19 TABLET | Refills: 0 | Status: SHIPPED | OUTPATIENT
Start: 2025-06-07 | End: 2025-06-26

## 2025-06-06 RX ORDER — CLOPIDOGREL BISULFATE 75 MG/1
75 TABLET ORAL DAILY
Status: DISCONTINUED | OUTPATIENT
Start: 2025-06-06 | End: 2025-06-06 | Stop reason: HOSPADM

## 2025-06-06 RX ORDER — AMLODIPINE BESYLATE 5 MG/1
5 TABLET ORAL DAILY
Status: DISCONTINUED | OUTPATIENT
Start: 2025-06-06 | End: 2025-06-06 | Stop reason: HOSPADM

## 2025-06-06 RX ADMIN — SODIUM CHLORIDE, PRESERVATIVE FREE 10 ML: 5 INJECTION INTRAVENOUS at 08:50

## 2025-06-06 RX ADMIN — ASPIRIN 81 MG: 81 TABLET, CHEWABLE ORAL at 08:50

## 2025-06-06 RX ADMIN — AMLODIPINE BESYLATE 5 MG: 5 TABLET ORAL at 08:50

## 2025-06-06 RX ADMIN — CLOPIDOGREL 75 MG: 75 TABLET, FILM COATED ORAL at 08:50

## 2025-06-06 RX ADMIN — ENOXAPARIN SODIUM 40 MG: 100 INJECTION SUBCUTANEOUS at 08:50

## 2025-06-06 NOTE — DISCHARGE SUMMARY
Hospitalist Discharge Summary   Admit Date:  2025  3:54 PM   DC Note date: 2025  Name:  Gabe Ray   Age:  54 y.o.  Sex:  male  :  1970   MRN:  [id number]   Room:  [facility]  PCP:  Arjun Robert MD    Presenting Complaint: No chief complaint on file.     Initial Admission Diagnosis: Dizziness [R42]     Problem List for this Hospitalization (present on admission):    Principal Problem:    Dizziness  Active Problems:    Weakness    PFO (patent foramen ovale)  Resolved Problems:    * No resolved hospital problems. *      Hospital Course:    This is a 54 y.o. male with medical history of hypertension, presented to the ER with dizziness and left-sided weakness.  Patient reports that he has hypertension for which he was previously on lisinopril which was discontinued as he was having cough with the medication.  He was advised experiencing dizziness.  Blood pressure was checked and was high 169/102 to 173/99.  He was brought to the ER.  Code stroke was called in the ER.  Stat CT head and CTA head and neck were done which were negative.  He was eval by teleneurology.  tPA was not recommended due to low NIH stroke scale score inpatient and symptoms improved.  MRI of the brain was done  and showed chronic microangiopathy.  No evidence of acute ischemic stroke.  Neurology recommends aspirin, Plavix, statin.  Patient was prescribed dual antiplatelet therapy for 3 weeks and he will continue aspirin after that.  He was also prescribed high intensity statin.  TIA risk is mild to moderate.  Echocardiogram was done  which showed PFO, mildly reduced left ventricular ejection fraction of 50 to 55%.  Concerned about arrhythmias.  Patient will need 30-day cardiac monitor.  Cardiology was consulted.  Recommends outpatient follow-up for PFO/cardiac monitor.  Cardiology was contacted and [facility] cardiology office will contact patient for setting up 30-day cardiac monitor.  Duplex ultrasound bilateral lower  04:14 AM    TRIG 99 06/05/2025 04:14 AM      Thyroid  No results found for: \"TSHELE\", \"[id number]\"     Most Recent UA Lab Results   Component Value Date/Time    COLORU YELLOW/STRAW 03/25/2024 08:24 AM    APPEARANCE CLEAR 03/25/2024 08:24 AM    PROTEINU Negative 03/25/2024 08:24 AM    GLUCOSEU Negative 03/25/2024 08:24 AM    KETUA Negative 03/25/2024 08:24 AM    BILIRUBINUR Negative 03/25/2024 08:24 AM    BLOODU Negative 03/25/2024 08:24 AM    UROBILINOGEN 0.2 03/25/2024 08:24 AM    NITRU Negative 03/25/2024 08:24 AM    LEUKOCYTESUR Negative 03/25/2024 08:24 AM        Microbiology:  Results       ** No results found for the last 336 hours. **            All Labs from Last 24 Hrs:  Recent Results (from the past 24 hours)   Echo (TTE) complete (PRN contrast/bubble/strain/3D)    Collection Time: 06/05/25 11:29 AM   Result Value Ref Range    LA Minor Axis 5.2 cm    LA Major [place] 5.4 cm    LA Area 2C 18.3 cm2    LA Area 4C 16.9 cm2    LA Volume MOD A2C 52 18 - 58 mL    LA Volume MOD A4C 44 18 - 58 mL    LA Volume BP 49 18 - 58 mL    LA Diameter 4.3 cm    AV Mean Velocity 0.8 m/s    AV Mean Gradient 3 mmHg    AV VTI 19.5 cm    AV Peak Velocity 1.0 m/s    AV Peak Gradient 4 mmHg    AV Peak Gradient 4 mmHg    AV Area by VTI 4.7 cm2    AV Area by Peak Velocity 4.8 cm2    Aortic Root 3.9 cm    Ascending Aorta 4.3 cm    IVC Proxmal 1.5 cm    IVSd 1.3 (A) 0.6 - 1.0 cm    LVIDd 4.4 4.2 - 5.9 cm    LVIDs 3.3 cm    LVOT Diameter 2.6 cm    LVOT Mean Gradient 2 mmHg    LVOT VTI 16.7 cm    LVOT Peak Velocity 0.9 m/s    LVOT Peak Gradient 3 mmHg    LVPWd 1.3 (A) 0.6 - 1.0 cm    LV E' Lateral Velocity 11.00 cm/s    LV E' Septal Velocity 6.09 cm/s    LVOT Area 5.3 cm2    LVOT SV 88.6 ml    MV E Wave Deceleration Time 328.0 ms    MV A Velocity 0.78 m/s    MV E Velocity 0.56 m/s    MV Mean Gradient 1 mmHg    MV VTI 20.4 cm    MV Mean Velocity 0.5 m/s    MV Max Velocity 0.8 m/s    MV Peak Gradient 3 mmHg    MV Area by VTI 4.3 cm2    SD End

## 2025-06-06 NOTE — PROGRESS NOTES
[facility], [facility].    [facility] [employer]    2025       RE: Gabe Ray  : 1970      To Whom It May Concern,    This is to certify that Gabe Ray was admitted into the hospital on 2025 and may return to work on 25      Please feel free to contact my office at the hospital ([phone]) if you have any questions or concerns.  Thank you for your assistance in this matter.    Sincerely,  Niels Camara MD

## 2025-06-06 NOTE — PROGRESS NOTES
NIH 0. No new concerns voiced at this time. NSR on remote telemetry. Hourly rounds completed, all needs met this shift. Bed in L/L with call light in reach. Report to be given to dayshift nurse.       Family at bedside.

## 2025-06-06 NOTE — PROGRESS NOTES
Neurology Progress Note       History: This is a 54-year-old man who presents with lightheadedness and subtle abnormalities on the left side of the body.  A couple of days ago, the patient was loading hay into the back of his truck and felt his heart racing.  He was then exercising and his heart rate got into the low 180s.  With both of these episodes he became short of breath.  With the second episode he had significant lightheadedness.  Because the patient had some subtle abnormalities on the left side of the body he was admitted for stroke/TIA rule out.  Currently, he feels back to normal.  He also states that he had generalized weakness during these episodes.    Interval history: No complaints.  Doing well.  Echocardiogram showed a patent curiel ovale.  Lower extremity Dopplers are normal.      Exam: Pertinent positives and negatives include:    General - Well developed, well nourished, in no apparent distress. Pleasant and conversant.   HEENT - Normocephalic, atraumatic.  Neck -No masses   Lungs - Normal work of breathing   Abdomen - No masses   Extremities - No edema and no rashes.   Psychiatric - Mood and affect are normal    Neurological examination - Comprehension, attention , memory and reasoning are intact. Language and speech are normal. On cranial nerve examination pupils are equal round and reactive to light (cranial nerve II and III).  Visual acuity is adequate (cranial nerve II). Visual fields are full to finger confrontation (CN II). Extraocular motility is normal (CN III, IV, VI). Face is symmetric (CN VII). Hearing is grossly intact to verbal communication (CN VIII). Motor examination - There is normal bulk. Power is full throughout on the right.  On the left he has very subtle weakness with slight pronation of the hands on the left and relative weakness of the left leg. Cerebellar examination is normal with finger-no-nose testing. Gait and stance are normal.    I reviewed the patient's CT and  CTA.  No acute intracranial abnormalities        Assessment and Plan: 54-year-old man with lightheadedness in the setting of tachycardia and shortness of breath.  This has occurred multiple times when doing physical activity.  He had subtle weakness in the left side of the body.  MRI of the brain is reassuring.    Recommendations  For now, continue aspirin, Plavix, and atorvastatin  Check blood pressure twice daily.  I suspect he has issues with hypertension based on chronic microangiopathic changes.  30-day cardiac monitor  Suspicion that this was a TIA is low to moderate.  I would hold off on PFO closure for the time being.  Monitoring heart rate and rhythm is most important at this time  Follow-up with Genny Marks NP in TIA+ clinic.  Neurology will place this order.          Cumulative time spent today was 35 minutes which included chart review, obtaining history (from patient, family, or other providers), review of images, examining the patient, and counseling the patient and/or family on medical condition.

## 2025-06-08 NOTE — PROGRESS NOTES
[facility] CARDIOLOGY History & Physical                 Reason for Visit: Posthospitalization follow-up    Subjective:     Patient is a 54 y.o. male with a PMH of TAA, hypertension, hyperlipidemia and interatrial cardiac shunt who presents as a posthospitalization follow-up.  He was hospitalized in June 2025 for neurological symptoms.  According to neurology, \"suspicion that this was a TIA is low to moderate\".  Neurology recommended against PFO closure.  He had a brain MRI in June 2025 that noted no acute CVA.  An ambulatory ECG monitor was placed.  The patient had a TTE in June 2025 that was noted to demonstrate a low normal EF and interatrial cardiac shunt.  RV size and RA size were noted to be normal.  His ascending aorta was noted to be 4.3 cm.  He denies angina.  He reports dyspnea randomly while resting that lasts for about 15 minutes.  The patient denies hemoptysis.      Past Medical History:   Diagnosis Date    COVID-19     x 3last time 2021    Depression with anxiety     Elevated PSA 01/2024    History of hypertension     no longer requires,    Kidney stone     Megaloblastic erythrocytes     Non-melanoma skin cancer     Prostate cancer (HCC)       Past Surgical History:   Procedure Laterality Date    APPENDECTOMY  2010    COLONOSCOPY  2023    PROSTATE BIOPSY N/A 1/29/2024    PROSTATE BIOPSY FUSION performed by Chi Isaacs DO at [facility] MAIN OR    PROSTATECTOMY N/A 3/28/2024    PROSTATECTOMY LAPAROSCOPIC ROBOTIC/ POSSIBLE BILATERAL LYMPH NODE DISSECTION performed by Chi Isaacs DO at [facility] MAIN OR      Family History   Problem Relation Age of Onset    Elevated Lipids Father     Heart Attack Mother     Lung Disease Mother     Heart Attack Father     Heart Disease Mother     Heart Disease Sister       Social History     Tobacco Use    Smoking status: Never    Smokeless tobacco: Never   Substance Use Topics    Alcohol use: Yes     Alcohol/week: 1.0 standard drink of alcohol     Types: 1 Drinks

## 2025-06-09 ENCOUNTER — TELEPHONE (OUTPATIENT)
Dept: FAMILY MEDICINE CLINIC | Facility: CLINIC | Age: 55
End: 2025-06-09

## 2025-06-09 ENCOUNTER — OFFICE VISIT (OUTPATIENT)
Age: 55
End: 2025-06-09
Payer: COMMERCIAL

## 2025-06-09 VITALS
BODY MASS INDEX: 28.36 KG/M2 | SYSTOLIC BLOOD PRESSURE: 118 MMHG | HEART RATE: 74 BPM | HEIGHT: 71 IN | DIASTOLIC BLOOD PRESSURE: 84 MMHG | WEIGHT: 202.6 LBS

## 2025-06-09 DIAGNOSIS — E78.5 HYPERLIPIDEMIA, UNSPECIFIED HYPERLIPIDEMIA TYPE: ICD-10-CM

## 2025-06-09 DIAGNOSIS — I71.20 THORACIC AORTIC ANEURYSM WITHOUT RUPTURE, UNSPECIFIED PART: Primary | ICD-10-CM

## 2025-06-09 DIAGNOSIS — Q24.8 INTERATRIAL CARDIAC SHUNT: ICD-10-CM

## 2025-06-09 DIAGNOSIS — I10 HYPERTENSION, UNSPECIFIED TYPE: ICD-10-CM

## 2025-06-09 DIAGNOSIS — R06.00 DYSPNEA, UNSPECIFIED TYPE: ICD-10-CM

## 2025-06-09 PROCEDURE — 3074F SYST BP LT 130 MM HG: CPT | Performed by: INTERNAL MEDICINE

## 2025-06-09 PROCEDURE — 99214 OFFICE O/P EST MOD 30 MIN: CPT | Performed by: INTERNAL MEDICINE

## 2025-06-09 PROCEDURE — 3079F DIAST BP 80-89 MM HG: CPT | Performed by: INTERNAL MEDICINE

## 2025-06-09 NOTE — TELEPHONE ENCOUNTER
Patient was seen at the Hospital and was in there for about 3 days.    Patient is scheduled to see his Neurologist on 06/12.    Advised to PT that ins will only cover 1 hospital follow up. Pt stated that Dr. Robert will handle his [facility] paperwork for his job and was told by the hospital to f/u with his primary care.    No appointments available unless ok to use 1140?     Does pt need to see us instead of Neurology.       -Please Advise

## 2025-06-10 ENCOUNTER — TELEPHONE (OUTPATIENT)
Dept: FAMILY MEDICINE CLINIC | Facility: CLINIC | Age: 55
End: 2025-06-10

## 2025-06-10 NOTE — TELEPHONE ENCOUNTER
Care Transitions Initial Follow Up Call    Outreach made within 2 business days of discharge: Yes    Patient: Gabe Ray Patient : 1970   MRN: [id number]  Reason for Admission: 25  Discharge Date: 25       Spoke with: Patient     Discharge department/facility: [facility] Interactive Patient Contact:  Was patient able to fill all prescriptions: Yes  Was patient instructed to bring all medications to the follow-up visit: Yes  Is patient taking all medications as directed in the discharge summary? Yes  Does patient understand their discharge instructions: Yes  Does patient have questions or concerns that need addressed prior to 7-14 day follow up office visit: no    Additional needs identified to be addressed with provider  No needs identified             Scheduled appointment with PCP within 7-14 days    Follow Up  Future Appointments   Date Time Provider Department Center   2025 11:40 AM Arjun Robert MD [facility]   2025 11:00 AM Genny Marks APRN [facility]   2025  3:00 PM [facility] ECHO 21 [facility]   2025  8:15 AM Fuentes Baca MD [facility]   2025 10:45 AM [id number] BLOOD DRAW [id number] [facility]   2025 10:00 AM Chi Isaacs DO [id number] [facility]       KEVIN NICHOLAS MA

## 2025-06-10 NOTE — TELEPHONE ENCOUNTER
----- Message from Yulissa MIRELES sent at 6/10/2025  9:13 AM EDT -----  Regarding: TCM  Please call and complete TCM documentation, scheduled tomorrow for follow up with Dr. Robert.      Thanks,  Yulissa

## 2025-06-11 ENCOUNTER — OFFICE VISIT (OUTPATIENT)
Dept: FAMILY MEDICINE CLINIC | Facility: CLINIC | Age: 55
End: 2025-06-11

## 2025-06-11 VITALS
HEIGHT: 71 IN | HEART RATE: 81 BPM | BODY MASS INDEX: 28.28 KG/M2 | DIASTOLIC BLOOD PRESSURE: 84 MMHG | WEIGHT: 202 LBS | SYSTOLIC BLOOD PRESSURE: 133 MMHG

## 2025-06-11 DIAGNOSIS — G45.9 TIA (TRANSIENT ISCHEMIC ATTACK): ICD-10-CM

## 2025-06-11 DIAGNOSIS — I10 ESSENTIAL (PRIMARY) HYPERTENSION: ICD-10-CM

## 2025-06-11 DIAGNOSIS — Z09 HOSPITAL DISCHARGE FOLLOW-UP: Primary | ICD-10-CM

## 2025-06-11 DIAGNOSIS — R45.89 ANXIETY ABOUT HEALTH: ICD-10-CM

## 2025-06-11 RX ORDER — AMLODIPINE BESYLATE 5 MG/1
5 TABLET ORAL DAILY
Qty: 90 TABLET | Refills: 1 | Status: SHIPPED | OUTPATIENT
Start: 2025-06-11 | End: 2025-12-08

## 2025-06-11 RX ORDER — ATORVASTATIN CALCIUM 80 MG/1
80 TABLET, FILM COATED ORAL NIGHTLY
Qty: 90 TABLET | Refills: 1 | Status: SHIPPED | OUTPATIENT
Start: 2025-06-11 | End: 2025-12-08

## 2025-06-11 RX ORDER — ASPIRIN 81 MG/1
81 TABLET, CHEWABLE ORAL DAILY
Qty: 90 TABLET | Refills: 1 | Status: SHIPPED | OUTPATIENT
Start: 2025-06-11 | End: 2025-12-08

## 2025-06-11 RX ORDER — ESCITALOPRAM OXALATE 10 MG/1
10 TABLET ORAL
Qty: 90 TABLET | Refills: 1 | Status: SHIPPED | OUTPATIENT
Start: 2025-06-11

## 2025-06-11 ASSESSMENT — PATIENT HEALTH QUESTIONNAIRE - PHQ9
6. FEELING BAD ABOUT YOURSELF - OR THAT YOU ARE A FAILURE OR HAVE LET YOURSELF OR YOUR FAMILY DOWN: NOT AT ALL
8. MOVING OR SPEAKING SO SLOWLY THAT OTHER PEOPLE COULD HAVE NOTICED. OR THE OPPOSITE, BEING SO FIGETY OR RESTLESS THAT YOU HAVE BEEN MOVING AROUND A LOT MORE THAN USUAL: NOT AT ALL
4. FEELING TIRED OR HAVING LITTLE ENERGY: NOT AT ALL
SUM OF ALL RESPONSES TO PHQ QUESTIONS 1-9: 0
2. FEELING DOWN, DEPRESSED OR HOPELESS: NOT AT ALL
SUM OF ALL RESPONSES TO PHQ QUESTIONS 1-9: 0
10. IF YOU CHECKED OFF ANY PROBLEMS, HOW DIFFICULT HAVE THESE PROBLEMS MADE IT FOR YOU TO DO YOUR WORK, TAKE CARE OF THINGS AT HOME, OR GET ALONG WITH OTHER PEOPLE: NOT DIFFICULT AT ALL
SUM OF ALL RESPONSES TO PHQ QUESTIONS 1-9: 0
3. TROUBLE FALLING OR STAYING ASLEEP: NOT AT ALL
1. LITTLE INTEREST OR PLEASURE IN DOING THINGS: NOT AT ALL
7. TROUBLE CONCENTRATING ON THINGS, SUCH AS READING THE NEWSPAPER OR WATCHING TELEVISION: NOT AT ALL
SUM OF ALL RESPONSES TO PHQ QUESTIONS 1-9: 0
9. THOUGHTS THAT YOU WOULD BE BETTER OFF DEAD, OR OF HURTING YOURSELF: NOT AT ALL
5. POOR APPETITE OR OVEREATING: NOT AT ALL

## 2025-06-11 NOTE — PROGRESS NOTES
tablet Take 1 tablet by mouth as needed for Erectile Dysfunction 20 tablet 6        Medications patient taking as of now reconciled against medications ordered at time of hospital discharge: Yes        Objective:    /84   Pulse 81   Ht 1.803 m (5' 11\")   Wt 91.6 kg (202 lb)   BMI 28.17 kg/m²         An electronic signature was used to authenticate this note.  --Arjun Robert MD

## 2025-06-12 ENCOUNTER — OFFICE VISIT (OUTPATIENT)
Dept: NEUROLOGY | Age: 55
End: 2025-06-12
Payer: COMMERCIAL

## 2025-06-12 VITALS
BODY MASS INDEX: 28.7 KG/M2 | OXYGEN SATURATION: 98 % | DIASTOLIC BLOOD PRESSURE: 87 MMHG | HEIGHT: 71 IN | WEIGHT: 205 LBS | SYSTOLIC BLOOD PRESSURE: 128 MMHG | HEART RATE: 80 BPM

## 2025-06-12 DIAGNOSIS — Q21.12 PFO (PATENT FORAMEN OVALE): ICD-10-CM

## 2025-06-12 DIAGNOSIS — R29.818 SUSPECTED SLEEP APNEA: ICD-10-CM

## 2025-06-12 DIAGNOSIS — I10 HTN, GOAL BELOW 140/80: ICD-10-CM

## 2025-06-12 DIAGNOSIS — E78.5 HYPERLIPIDEMIA LDL GOAL <70: ICD-10-CM

## 2025-06-12 DIAGNOSIS — G45.9 TIA (TRANSIENT ISCHEMIC ATTACK): ICD-10-CM

## 2025-06-12 DIAGNOSIS — Z09 HOSPITAL DISCHARGE FOLLOW-UP: Primary | ICD-10-CM

## 2025-06-12 PROCEDURE — 3074F SYST BP LT 130 MM HG: CPT | Performed by: NURSE PRACTITIONER

## 2025-06-12 PROCEDURE — 3079F DIAST BP 80-89 MM HG: CPT | Performed by: NURSE PRACTITIONER

## 2025-06-12 PROCEDURE — 1111F DSCHRG MED/CURRENT MED MERGE: CPT | Performed by: NURSE PRACTITIONER

## 2025-06-12 PROCEDURE — 99204 OFFICE O/P NEW MOD 45 MIN: CPT | Performed by: NURSE PRACTITIONER

## 2025-06-12 ASSESSMENT — ENCOUNTER SYMPTOMS
GASTROINTESTINAL NEGATIVE: 1
EYES NEGATIVE: 1
ALLERGIC/IMMUNOLOGIC NEGATIVE: 1
APNEA: 1

## 2025-06-12 NOTE — PROGRESS NOTES
from hospital.    Diagnoses and all orders for this visit:    Hospital discharge follow-up  -     TX DISCHARGE MEDS RECONCILED W/ CURRENT OUTPATIENT MED LIST    TIA (transient ischemic attack)  Continue secondary stroke prevention DAPT x 21 days, then monotherapy with asa.   Goal SBP <140/80  Goal LDL<70  Goal A1C <7.0  Discussed Mediterranean diet and increasing cardiovascular exercise to goal of 30 minutes daily.   Depression screening completed.   Reviewed BE FAST and when to call 911.     -     Home Sleep Study; Future    PFO (patent foramen ovale)  TTE + grade 3 PFO. Followed by cardiology.   BLE venous duplex negative for DVT.   Recommend closure given recent TIA and hypercoagulability in the setting of prostate cancer.   HTN, goal below 140/80  Stable. Amlodipine.   Recommend keeping BP and HR log.   Hyperlipidemia LDL goal <70  Last  goal less than 70  Continue atorvastatin.   Suspected sleep apnea  Will obtain sleep study to evaluate for underlying DAVONTE.   -     Home Sleep Study; Future        Follow up in 3 months or sooner if needed         Genny L Rice, APRN  [facility] [address], [address] [place] [address]  Phone:[phone]

## 2025-06-23 ENCOUNTER — TELEPHONE (OUTPATIENT)
Dept: FAMILY MEDICINE CLINIC | Facility: CLINIC | Age: 55
End: 2025-06-23

## 2025-06-23 NOTE — TELEPHONE ENCOUNTER
Patient sent a [employer] message on 6/20/25 needing a Medical Request Form completed.  Called today for status.  FYI, if we don't have a copy of the form, he will get it to us.  Thanks.

## 2025-06-26 ENCOUNTER — HOSPITAL ENCOUNTER (EMERGENCY)
Age: 55
Discharge: HOME | End: 2025-06-26
Attending: EMERGENCY MEDICINE
Payer: COMMERCIAL

## 2025-06-26 ENCOUNTER — APPOINTMENT (OUTPATIENT)
Dept: GENERAL RADIOLOGY | Age: 55
End: 2025-06-26
Payer: COMMERCIAL

## 2025-06-26 VITALS
TEMPERATURE: 97.9 F | HEART RATE: 79 BPM | RESPIRATION RATE: 19 BRPM | BODY MASS INDEX: 28 KG/M2 | DIASTOLIC BLOOD PRESSURE: 87 MMHG | SYSTOLIC BLOOD PRESSURE: 125 MMHG | WEIGHT: 200 LBS | OXYGEN SATURATION: 94 % | HEIGHT: 71 IN

## 2025-06-26 DIAGNOSIS — R06.00 DYSPNEA, UNSPECIFIED TYPE: Primary | ICD-10-CM

## 2025-06-26 LAB
ALBUMIN SERPL-MCNC: 4.5 G/DL (ref 3.5–5)
ALBUMIN/GLOB SERPL: 1.6 (ref 1–1.9)
ALP SERPL-CCNC: 67 U/L (ref 40–129)
ALT SERPL-CCNC: 69 U/L (ref 12–65)
ANION GAP SERPL CALC-SCNC: 11 MMOL/L (ref 7–16)
AST SERPL-CCNC: 53 U/L (ref 15–37)
BASOPHILS # BLD: 0.08 K/UL (ref 0–0.2)
BASOPHILS NFR BLD: 1.1 % (ref 0–2)
BILIRUB SERPL-MCNC: 0.3 MG/DL (ref 0–1.2)
BUN SERPL-MCNC: 15 MG/DL (ref 6–23)
CALCIUM SERPL-MCNC: 9.1 MG/DL (ref 8.8–10.2)
CHLORIDE SERPL-SCNC: 104 MMOL/L (ref 98–107)
CO2 SERPL-SCNC: 26 MMOL/L (ref 20–29)
CREAT SERPL-MCNC: 1.03 MG/DL (ref 0.8–1.3)
D DIMER PPP FEU-MCNC: 0.28 UG/ML(FEU)
DIFFERENTIAL METHOD BLD: NORMAL
EKG ATRIAL RATE: 84 BPM
EKG DIAGNOSIS: NORMAL
EKG P AXIS: 50 DEGREES
EKG P-R INTERVAL: 189 MS
EKG Q-T INTERVAL: 370 MS
EKG QRS DURATION: 98 MS
EKG QTC CALCULATION (BAZETT): 435 MS
EKG R AXIS: -39 DEGREES
EKG T AXIS: 54 DEGREES
EKG VENTRICULAR RATE: 83 BPM
EOSINOPHIL # BLD: 0.21 K/UL (ref 0–0.8)
EOSINOPHIL NFR BLD: 2.8 % (ref 0.5–7.8)
ERYTHROCYTE [DISTWIDTH] IN BLOOD BY AUTOMATED COUNT: 12.2 % (ref 11.9–14.6)
GLOBULIN SER CALC-MCNC: 2.9 G/DL (ref 2.3–3.5)
GLUCOSE SERPL-MCNC: 110 MG/DL (ref 65–100)
HCT VFR BLD AUTO: 41.1 % (ref 41.1–50.3)
HGB BLD-MCNC: 14.3 G/DL (ref 13.6–17.2)
IMM GRANULOCYTES # BLD AUTO: 0.03 K/UL (ref 0–0.5)
IMM GRANULOCYTES NFR BLD AUTO: 0.4 % (ref 0–5)
LYMPHOCYTES # BLD: 2.52 K/UL (ref 0.5–4.6)
LYMPHOCYTES NFR BLD: 33.6 % (ref 13–44)
MAGNESIUM SERPL-MCNC: 2 MG/DL (ref 1.8–2.4)
MCH RBC QN AUTO: 31.4 PG (ref 26.1–32.9)
MCHC RBC AUTO-ENTMCNC: 34.8 G/DL (ref 31.4–35)
MCV RBC AUTO: 90.1 FL (ref 82–102)
MONOCYTES # BLD: 0.43 K/UL (ref 0.1–1.3)
MONOCYTES NFR BLD: 5.7 % (ref 4–12)
NEUTS SEG # BLD: 4.24 K/UL (ref 1.7–8.2)
NEUTS SEG NFR BLD: 56.4 % (ref 43–78)
NRBC # BLD: 0 K/UL (ref 0–0.2)
NT PRO BNP: <36 PG/ML (ref 0–450)
PLATELET # BLD AUTO: 294 K/UL (ref 150–450)
PMV BLD AUTO: 9.4 FL (ref 9.4–12.3)
POTASSIUM SERPL-SCNC: 3.9 MMOL/L (ref 3.5–5.1)
PROT SERPL-MCNC: 7.4 G/DL (ref 6.3–8.2)
RBC # BLD AUTO: 4.56 M/UL (ref 4.23–5.6)
SODIUM SERPL-SCNC: 141 MMOL/L (ref 133–143)
TROPONIN T SERPL HS-MCNC: <6 NG/L (ref 0–22)
WBC # BLD AUTO: 7.5 K/UL (ref 4.3–11.1)

## 2025-06-26 PROCEDURE — 85379 FIBRIN DEGRADATION QUANT: CPT

## 2025-06-26 PROCEDURE — 84484 ASSAY OF TROPONIN QUANT: CPT

## 2025-06-26 PROCEDURE — 93010 ELECTROCARDIOGRAM REPORT: CPT | Performed by: INTERNAL MEDICINE

## 2025-06-26 PROCEDURE — 83880 ASSAY OF NATRIURETIC PEPTIDE: CPT

## 2025-06-26 PROCEDURE — 94762 N-INVAS EAR/PLS OXIMTRY CONT: CPT

## 2025-06-26 PROCEDURE — 99285 EMERGENCY DEPT VISIT HI MDM: CPT

## 2025-06-26 PROCEDURE — 83735 ASSAY OF MAGNESIUM: CPT

## 2025-06-26 PROCEDURE — 80053 COMPREHEN METABOLIC PANEL: CPT

## 2025-06-26 PROCEDURE — 93005 ELECTROCARDIOGRAM TRACING: CPT | Performed by: EMERGENCY MEDICINE

## 2025-06-26 PROCEDURE — 85025 COMPLETE CBC W/AUTO DIFF WBC: CPT

## 2025-06-26 PROCEDURE — 71046 X-RAY EXAM CHEST 2 VIEWS: CPT

## 2025-06-26 ASSESSMENT — LIFESTYLE VARIABLES
HOW MANY STANDARD DRINKS CONTAINING ALCOHOL DO YOU HAVE ON A TYPICAL DAY: PATIENT DOES NOT DRINK
HOW OFTEN DO YOU HAVE A DRINK CONTAINING ALCOHOL: NEVER

## 2025-06-26 ASSESSMENT — PAIN - FUNCTIONAL ASSESSMENT: PAIN_FUNCTIONAL_ASSESSMENT: NONE - DENIES PAIN

## 2025-06-26 NOTE — ED PROVIDER NOTES
Emergency Department Provider Note       S EMERGENCY DEPT   PCP: Arjun Robert MD   Age: 54 y.o.   Sex: male     DISPOSITION Decision To Discharge 06/26/2025 06:17:11 AM    ICD-10-CM    1. Dyspnea, unspecified type  R06.00           Medical Decision Making     Workup unremarkable.  Normal troponin, BNP, and D-dimer.  No recurrent episodes of shortness of breath since arrival to the emergency department.  Episode only lasted 30 minutes.  Discussed possible rapid alterations in blood pressure, blood sugar, anxiety.  Advised primary care to follow-up.  Given return precautions.     1 or more acute illnesses that pose a threat to life or bodily function.   Shared medical decision making was utilized in creating the patients health plan today.  I independently ordered and reviewed each unique test.    I reviewed external records: ED visit note from a different ED.   I reviewed external records: provider visit note from PCP.  I reviewed external records: provider visit note from outside specialist.  I reviewed external records: previous EKG including cardiologist interpretation.    I reviewed external records: previous lab results from outside ED.  I reviewed external records: previous imaging study including radiologist interpretation.     ED cardiac monitoring rhythm strip was ordered and interpreted:  sinus rhythm, no evidence of an arrhythmia  ST Segments:Nonspecific ST segments - NO STEMI   Rate: 83  I interpreted the X-rays no acute cardiopulmonary process.              History     54-year-old male woke up with sudden onset shortness of breath, nausea, and sweating at 4 AM.  Symptoms lasted about 30 minutes and resolved.  He was admitted to the hospital June 4 with a TIA.  He is now on Plavix and has been wearing a heart monitor since.  He has had some intermittent episodes of shortness of breath since that time, but they had not been associated with sweating.  He denies chest pain or abdominal pain.  Denies

## 2025-06-26 NOTE — ED TRIAGE NOTES
Pt ambulatory to triage with steady gait. Pt reports waking up around 0400 with SOB and sweaty hands. Pt reports pulse in the 130s but BP with normal. Pt also reports some weakness in bilateral legs.

## 2025-06-26 NOTE — DISCHARGE INSTRUCTIONS
No obvious cause was found for your shortness of breath today.  Return for worsening or concerning symptoms.

## 2025-06-27 ENCOUNTER — RESULTS FOLLOW-UP (OUTPATIENT)
Age: 55
End: 2025-06-27

## 2025-06-27 ENCOUNTER — TELEPHONE (OUTPATIENT)
Age: 55
End: 2025-06-27

## 2025-06-27 NOTE — TELEPHONE ENCOUNTER
----- Message from Dr. Fuentes Baca MD sent at 6/27/2025  8:47 AM EDT -----  Please let the patient know the stress test was negative for myocardial ischemia.

## 2025-07-01 NOTE — TELEPHONE ENCOUNTER
I reviewed lifting restrictions with Dr Baca. He states patient should not lift over 25-30lbs. He will continue to monitor his TAA. Pt scheduled for repeat CTA on 07/02/25. Will fax CTA report and office note with STD forms as requested by [employer].

## 2025-07-01 NOTE — TELEPHONE ENCOUNTER
I spoke to the patient. I informed him that the forms were received by our office from [employer], but unfortunately have been misplaced and we have not been able to locate them. Pt states he will call [employer] and have them resent.   He states he is currently out of work.He states Dr Baca told him he could not do any heavy lifting due to his TAA. 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 If so, for how long. Pt also states forms have been sent to his PCP and Neurology d/t TIA    Pt is scheduled for a CTA chest tomorrow 7/02/22.

## 2025-07-02 ENCOUNTER — TELEPHONE (OUTPATIENT)
Age: 55
End: 2025-07-02

## 2025-07-02 NOTE — TELEPHONE ENCOUNTER
Fuentes Baca MD to [facility] Cardiology Triage (Selected Message)      7/2/25  1:38 PM  Result Note  Please let the patient know that his aorta is 4 cm.  This warrants a surveillance CTA in 1 year.  His aorta is mildly enlarged.    CTA CHEST W WO CONTRAST

## 2025-07-03 NOTE — TELEPHONE ENCOUNTER
Forms completed and signed by Dr Baca. Forms faxed to [employer] at [phone]. Records attached. Fax confirmation received. A copy was sent to Medical records to be scanned to the pt's chart.

## 2025-07-08 ENCOUNTER — TELEPHONE (OUTPATIENT)
Age: 55
End: 2025-07-08

## 2025-07-08 NOTE — TELEPHONE ENCOUNTER
I received another fax from [email or web address] regarding ST Disability. The letter states they still haven't received the forms that were faxed to our office on 07/01/25.     I faxed the ST Disability forms back on 07/03/25. Fax confirmation was received and scanned to the pt's chart.    I re-faxed the forms today, with records attached. Fax confirmartion was received.

## 2025-07-11 PROBLEM — Z09 HOSPITAL DISCHARGE FOLLOW-UP: Status: RESOLVED | Noted: 2025-06-11 | Resolved: 2025-07-11

## 2025-07-11 NOTE — PROGRESS NOTES
NEW PATIENT ABSTRACT      Referral Diagnosis: TIA (transient ischemic attack)    Referring Provider  & Date of Referral: Arjun Robert MD on 6/11/25    Primary Care Provider: Arjun Robert MD    Presenting Symptoms: dizziness, left sided weakness    Family History of Cancer: Cancer-related family history is not on file.    Past Medical History:   Past Medical History:   Diagnosis Date    COVID-19     x 3last time 2021    Depression with anxiety     Elevated PSA 01/2024    History of hypertension     no longer requires,    Kidney stone     Megaloblastic erythrocytes     Non-melanoma skin cancer     Prostate cancer (HCC)        Background Information:     Pt presented to ED on 6/4/25 for c/o dizziness and left sided weakness. Hospitalized from 6/4/25-6/6/25. Stroke w/u completed. CT head and CTA head and neck negative. Pt was evaluated by teleneurology and tPA was not recommended due to low NIH stroke scale score inpatient and symptoms improved.  MRI of the brain done 6/5 and showed chronic microangiopathy.  No evidence of acute ischemic stroke. Aspirin, Plavix, and statin recommended by neurology. Duplex ultrasound bilateral lower extremities negative for DVT. Echocardiogram was done 6/5 which showed PFO, mildly reduced left ventricular ejection fraction of 50 to 55%. Pt instructed to f/u with cardiology and neurology outpatient.    Pertinent Notes from Referring Provider: Per Dr. Robert note, \"This TIA is still cryptogenic in my opinion, continue current plan, but I am sending him to hematology for a hypercoagulability workup.\"    Other Pertinent Information: Pt hx prostate cancer. Underwent prostatectomy in May 2024 by Dr. Isaacs.    Most Recent Tests:      Latest Reference Range & Units 06/26/25 05:27   Sodium 133 - 143 mmol/L 141   Potassium 3.5 - 5.1 mmol/L 3.9   Chloride 98 - 107 mmol/L 104   CARBON DIOXIDE 20 - 29 mmol/L 26   BUN,BUNPL 6 - 23 MG/DL 15   Creatinine 0.80 - 1.30 MG/DL 1.03   Anion Gap 7 - 16

## 2025-07-14 NOTE — PROGRESS NOTES
[facility]: Office Visit New Patient H & P    Reason for visit:  TIA    History of Present Illness:  History of Present Illness  The patient is a 54-year-old male referred for evaluation of suspected thrombophilia. He presented to the emergency department on 06/04/2025 with symptoms of dizziness and left-sided weakness. Computed tomography (CT) of the head and computed tomography angiography (CTA) of the head and neck revealed no acute abnormalities. He was not eligible for tissue plasminogen activator (tPA) therapy due to a low National Institutes of Health Stroke Scale (NIHSS) score. Magnetic resonance imaging (MRI) of the brain performed on 06/05/2025 indicated minimal chronic microangiopathy but was otherwise unremarkable. Venous duplex ultrasonography of the bilateral lower extremities showed no evidence of deep vein thrombosis (DVT). Evaluation by Dr. Bcaa from [facility] included a CTA of the chest, which identified a mildly prominent ascending thoracic aorta measuring 4 cm, with a recommendation for a 1-year surveillance CTA. A stress echocardiogram demonstrated no new wall motion abnormalities post-stress. His transient ischemic attack (TIA) remained cryptogenic, prompting referral for evaluation of a potential thrombophilic state. Complete blood count (CBC) on 06/26/2025 was unremarkable, and a chemistry panel showed mild elevations in aspartate aminotransferase (AST) and alanine aminotransferase (ALT) with normal total bilirubin levels.    His neurologist recommended closure of the patent foramen ovale (PFO) due to his history of localized prostate cancer treated with prostatectomy. The patient reports no history of thromboembolic events, myocardial infarction, cerebrovascular accident, or lymphadenopathy. Symptoms of dizziness and weakness persisted into the following morning, leading to his emergency department visit. He was admitted, experienced symptomatic improvement

## 2025-07-15 ENCOUNTER — OFFICE VISIT (OUTPATIENT)
Dept: ONCOLOGY | Age: 55
End: 2025-07-15
Payer: COMMERCIAL

## 2025-07-15 ENCOUNTER — HOSPITAL ENCOUNTER (OUTPATIENT)
Dept: LAB | Age: 55
Discharge: HOME | End: 2025-07-15
Payer: COMMERCIAL

## 2025-07-15 VITALS
TEMPERATURE: 98 F | HEIGHT: 71 IN | HEART RATE: 85 BPM | OXYGEN SATURATION: 97 % | SYSTOLIC BLOOD PRESSURE: 122 MMHG | BODY MASS INDEX: 28.13 KG/M2 | RESPIRATION RATE: 16 BRPM | WEIGHT: 200.9 LBS | DIASTOLIC BLOOD PRESSURE: 85 MMHG

## 2025-07-15 DIAGNOSIS — G45.9 TIA (TRANSIENT ISCHEMIC ATTACK): Primary | ICD-10-CM

## 2025-07-15 DIAGNOSIS — R79.89 ELEVATED LFTS: ICD-10-CM

## 2025-07-15 DIAGNOSIS — G45.9 TIA (TRANSIENT ISCHEMIC ATTACK): ICD-10-CM

## 2025-07-15 LAB
ALBUMIN SERPL-MCNC: 4.4 G/DL (ref 3.5–5)
ALBUMIN/GLOB SERPL: 1.4 (ref 1–1.9)
ALP SERPL-CCNC: 66 U/L (ref 40–129)
ALT SERPL-CCNC: 39 U/L (ref 8–55)
AST SERPL-CCNC: 31 U/L (ref 15–37)
BILIRUB DIRECT SERPL-MCNC: 0.3 MG/DL (ref 0–0.3)
BILIRUB SERPL-MCNC: 0.7 MG/DL (ref 0–1.2)
GLOBULIN SER CALC-MCNC: 3.1 G/DL (ref 2.3–3.5)
PROT SERPL-MCNC: 7.4 G/DL (ref 6.3–8.2)

## 2025-07-15 PROCEDURE — 80076 HEPATIC FUNCTION PANEL: CPT

## 2025-07-15 PROCEDURE — 85705 THROMBOPLASTIN INHIBITION: CPT

## 2025-07-15 PROCEDURE — 3079F DIAST BP 80-89 MM HG: CPT | Performed by: INTERNAL MEDICINE

## 2025-07-15 PROCEDURE — 86146 BETA-2 GLYCOPROTEIN ANTIBODY: CPT

## 2025-07-15 PROCEDURE — 36415 COLL VENOUS BLD VENIPUNCTURE: CPT

## 2025-07-15 PROCEDURE — 85732 THROMBOPLASTIN TIME PARTIAL: CPT

## 2025-07-15 PROCEDURE — 99204 OFFICE O/P NEW MOD 45 MIN: CPT | Performed by: INTERNAL MEDICINE

## 2025-07-15 PROCEDURE — 3074F SYST BP LT 130 MM HG: CPT | Performed by: INTERNAL MEDICINE

## 2025-07-15 PROCEDURE — 85613 RUSSELL VIPER VENOM DILUTED: CPT

## 2025-07-15 PROCEDURE — 85670 THROMBIN TIME PLASMA: CPT

## 2025-07-15 PROCEDURE — 86147 CARDIOLIPIN ANTIBODY EA IG: CPT

## 2025-07-15 ASSESSMENT — PATIENT HEALTH QUESTIONNAIRE - PHQ9
SUM OF ALL RESPONSES TO PHQ QUESTIONS 1-9: 0
SUM OF ALL RESPONSES TO PHQ QUESTIONS 1-9: 0
1. LITTLE INTEREST OR PLEASURE IN DOING THINGS: NOT AT ALL
SUM OF ALL RESPONSES TO PHQ QUESTIONS 1-9: 0
SUM OF ALL RESPONSES TO PHQ QUESTIONS 1-9: 0
2. FEELING DOWN, DEPRESSED OR HOPELESS: NOT AT ALL

## 2025-07-16 LAB
APTT SCREEN TO CONFIRM RATIO: 1.14 RATIO (ref 0–1.34)
B2 GLYCOPROT1 IGA SER-ACNC: <9 GPI IGA UNITS (ref 0–25)
B2 GLYCOPROT1 IGG SER-ACNC: <9 GPI IGG UNITS (ref 0–20)
B2 GLYCOPROT1 IGM SER-ACNC: <9 GPI IGM UNITS (ref 0–32)
CARDIOLIPIN IGA SER IA-ACNC: <9 APL U/ML (ref 0–11)
CARDIOLIPIN IGG SER IA-ACNC: <9 GPL U/ML (ref 0–14)
CARDIOLIPIN IGM SER IA-ACNC: <9 MPL U/ML (ref 0–12)
CONFIRM APTT/NORMAL: 37.4 SEC (ref 0–47.6)
LA 2 SCREEN W REFLEX-IMP: NORMAL
SCREEN APTT: 33.8 SEC (ref 0–43.5)
SCREEN DRVVT: 35.5 SEC (ref 0–47)
THROMBIN TIME: 19.2 SEC (ref 0–23)

## 2025-07-20 NOTE — PROGRESS NOTES
[facility] CARDIOLOGY Follow Up                 Reason for Visit: Follow-up testing    Subjective:     Patient is a 54 y.o. male with a PMH of TAA, hypertension, hyperlipidemia and interatrial cardiac shunt who presents for follow-up.  The patient was last seen in June 2025.  A thoracic CTA was ordered for TAA.  An KIRA was ordered for dyspnea.  He had an KIRA in June 2025 that was noted to be negative for myocardial ischemia.  His EF was noted to be normal.  His exercise capacity was above average.  No angina was noted during the stress test.  The patient had a thoracic CTA in July 2025 that noted a 4 cm TAA.  The patient wore an ambulatory ECG monitor for about 1 month in July 2025 that was noted to demonstrate predominantly sinus rhythm and no ectopy.  He reports dyspnea at both at rest and exertion.  He denies angina.      Past Medical History:   Diagnosis Date    COVID-19     x 3last time 2021    Depression with anxiety     Elevated PSA 01/2024    History of hypertension     no longer requires,    Kidney stone     Megaloblastic erythrocytes     Non-melanoma skin cancer     Prostate cancer (HCC)       Past Surgical History:   Procedure Laterality Date    APPENDECTOMY  2010    COLONOSCOPY  2023    PROSTATE BIOPSY N/A 1/29/2024    PROSTATE BIOPSY FUSION performed by Chi Isaacs DO at [facility] MAIN OR    PROSTATECTOMY N/A 3/28/2024    PROSTATECTOMY LAPAROSCOPIC ROBOTIC/ POSSIBLE BILATERAL LYMPH NODE DISSECTION performed by Chi Isaacs DO at [facility] MAIN OR      Family History   Problem Relation Age of Onset    Elevated Lipids Father     Heart Attack Father     Heart Attack Mother     Lung Disease Mother     Heart Disease Mother     Stroke Mother     Heart Disease Sister       Social History     Tobacco Use    Smoking status: Never    Smokeless tobacco: Never   Substance Use Topics    Alcohol use: Not Currently     Alcohol/week: 1.0 standard drink of alcohol     Types: 1 Drinks containing 0.5 oz of alcohol per

## 2025-07-22 ENCOUNTER — OFFICE VISIT (OUTPATIENT)
Age: 55
End: 2025-07-22
Payer: COMMERCIAL

## 2025-07-22 VITALS
HEART RATE: 65 BPM | WEIGHT: 201 LBS | DIASTOLIC BLOOD PRESSURE: 84 MMHG | SYSTOLIC BLOOD PRESSURE: 128 MMHG | BODY MASS INDEX: 28.14 KG/M2 | HEIGHT: 71 IN

## 2025-07-22 DIAGNOSIS — Q24.8 INTERATRIAL CARDIAC SHUNT: ICD-10-CM

## 2025-07-22 DIAGNOSIS — I71.20 THORACIC AORTIC ANEURYSM WITHOUT RUPTURE, UNSPECIFIED PART: Primary | ICD-10-CM

## 2025-07-22 DIAGNOSIS — E78.5 HYPERLIPIDEMIA, UNSPECIFIED HYPERLIPIDEMIA TYPE: ICD-10-CM

## 2025-07-22 DIAGNOSIS — I10 HYPERTENSION, UNSPECIFIED TYPE: ICD-10-CM

## 2025-07-22 DIAGNOSIS — R06.00 DYSPNEA, UNSPECIFIED TYPE: ICD-10-CM

## 2025-07-22 PROCEDURE — 3079F DIAST BP 80-89 MM HG: CPT | Performed by: INTERNAL MEDICINE

## 2025-07-22 PROCEDURE — 99214 OFFICE O/P EST MOD 30 MIN: CPT | Performed by: INTERNAL MEDICINE

## 2025-07-22 PROCEDURE — 3074F SYST BP LT 130 MM HG: CPT | Performed by: INTERNAL MEDICINE

## 2025-07-28 ENCOUNTER — OFFICE VISIT (OUTPATIENT)
Dept: FAMILY MEDICINE CLINIC | Facility: CLINIC | Age: 55
End: 2025-07-28
Payer: COMMERCIAL

## 2025-07-28 VITALS
HEART RATE: 88 BPM | WEIGHT: 201 LBS | SYSTOLIC BLOOD PRESSURE: 119 MMHG | DIASTOLIC BLOOD PRESSURE: 79 MMHG | BODY MASS INDEX: 28.14 KG/M2 | HEIGHT: 71 IN

## 2025-07-28 DIAGNOSIS — I10 ESSENTIAL (PRIMARY) HYPERTENSION: ICD-10-CM

## 2025-07-28 DIAGNOSIS — I71.20 THORACIC AORTIC ANEURYSM WITHOUT RUPTURE, UNSPECIFIED PART: Primary | ICD-10-CM

## 2025-07-28 DIAGNOSIS — R45.89 ANXIETY ABOUT HEALTH: ICD-10-CM

## 2025-07-28 DIAGNOSIS — G45.9 TIA (TRANSIENT ISCHEMIC ATTACK): ICD-10-CM

## 2025-07-28 PROCEDURE — 99214 OFFICE O/P EST MOD 30 MIN: CPT | Performed by: FAMILY MEDICINE

## 2025-07-28 PROCEDURE — 3078F DIAST BP <80 MM HG: CPT | Performed by: FAMILY MEDICINE

## 2025-07-28 PROCEDURE — 3074F SYST BP LT 130 MM HG: CPT | Performed by: FAMILY MEDICINE

## 2025-07-28 RX ORDER — AMLODIPINE BESYLATE 5 MG/1
5 TABLET ORAL DAILY
Qty: 90 TABLET | Refills: 1 | Status: SHIPPED | OUTPATIENT
Start: 2025-07-28 | End: 2026-01-24

## 2025-07-28 RX ORDER — ATORVASTATIN CALCIUM 80 MG/1
80 TABLET, FILM COATED ORAL NIGHTLY
Qty: 90 TABLET | Refills: 1 | Status: SHIPPED | OUTPATIENT
Start: 2025-07-28 | End: 2026-01-24

## 2025-07-28 RX ORDER — ESCITALOPRAM OXALATE 10 MG/1
10 TABLET ORAL
Qty: 90 TABLET | Refills: 1 | Status: SHIPPED | OUTPATIENT
Start: 2025-07-28

## 2025-07-28 ASSESSMENT — ENCOUNTER SYMPTOMS
CHEST TIGHTNESS: 0
SHORTNESS OF BREATH: 0
ABDOMINAL PAIN: 0
BLOOD IN STOOL: 0

## 2025-07-28 NOTE — PROGRESS NOTES
[facility]  _______________________________________  Arjun Robert MD, ROSELINE Quigley NP, ROSELINE Werner MD    [address] [place] [address]  Phone: [phone]  Fax: [phone]      Gabe Ray (:  1970) is a 54 y.o. male,Established patient, here for evaluation of the following chief complaint(s):  Forms (Needs FMLA completed for intermittent leave. Would like to return to work tomorrow )       A&P:  1. Essential (primary) hypertension  Stable, continue current regimen.   Recent labs reviewed.   - amLODIPine (NORVASC) 5 MG tablet; Take 1 tablet by mouth daily  Dispense: 90 tablet; Refill: 1    2. TIA (transient ischemic attack)  Stable, continue current regimen.     - atorvastatin (LIPITOR) 80 MG tablet; Take 1 tablet by mouth nightly  Dispense: 90 tablet; Refill: 1    3. Anxiety about health  Stable, continue current regimen.     - escitalopram (LEXAPRO) 10 MG tablet; Take 1 tablet by mouth nightly  Dispense: 90 tablet; Refill: 1    4. Thoracic aortic aneurysm without rupture, unspecified part  Continue restrictions per cardiology.       Return in about 6 months (around 2026) for Move sept appt to Beau ramirez.       Subjective     Here for FU, had prostate cancer and TIA in , it's been a rough year for him.     TIA/HTN: He's now on Amlodipine 5, ASA 81, Atorvastatin 80. Tolerating OK. Following with cards regarding his Thoracic Ao anuerysm, he is limited to not lifting more than 50# per cardiology. Also noted that his PFO is not indicated for closure.     He needs FMLA intermittent leave paperwork filled out, they only do 6mo at a time, starting tomorrow . He has restrictions for not lifting over 50# and asks for being able to take 2 days a week off as needed for doctor's visits or incapacities.     BP Readings from Last 3 Encounters:   25 119/79   25 128/84   07/15/25 122/85     Lab Results   Component

## (undated) DEVICE — SUTURE ETHLN SZ 2-0 L18IN NONABSORBABLE BLK L26MM PS 3/8 585H

## (undated) DEVICE — BLADE CLIPPER GEN PURP NS

## (undated) DEVICE — SOLUTION IRRIG 1000ML STRL H2O USP PLAS POUR BTL

## (undated) DEVICE — SOLUTION IV 1000ML 0.9% SOD CHL PH 5 INJ USP VIAFLX PLAS

## (undated) DEVICE — COVER PRB W1XL11.8IN ENDOCAVITY CLR E BND NEOGUARD

## (undated) DEVICE — DRAPE TWL SURG 16X26IN BLU ORB04] ALLCARE INC]

## (undated) DEVICE — ROBOTIC PROSTATE: Brand: MEDLINE INDUSTRIES, INC.

## (undated) DEVICE — AIRSEAL 12 MM ACCESS PORT AND PALM GRIP OBTURATOR WITH BLADELESS OPTICAL TIP, 120 MM LENGTH: Brand: AIRSEAL

## (undated) DEVICE — ARM DRAPE

## (undated) DEVICE — STERILE PACKED. BORE DIAMETER 1.6 MM; ANGLE OF INSERTION 19° TO THE LONG AXIS OF THE TRANSDUCER: Brand: SINGLE-USE BIPLANE BIOPSY GUIDE

## (undated) DEVICE — TRI-LUMEN FILTERED TUBE SET WITH ACTIVATED CHARCOAL FILTER: Brand: AIRSEAL

## (undated) DEVICE — NEEDLE INSUF L120MM ULT VERES ENDOPATH

## (undated) DEVICE — MAX-CORE® DISPOSABLE CORE BIOPSY INSTRUMENT, 18G X 25CM: Brand: MAX-CORE

## (undated) DEVICE — SUTURE MCRYL SZ 3-0 L27IN ABSRB VLT L17MM RB-1 1/2 CIR Y305H

## (undated) DEVICE — Z DISC USE 2764362 SEAL ENDOSCP INSTR DIA5-8MM UNIV FOR CANN DA VINCI XI

## (undated) DEVICE — PAD PT POS 36 IN SURGYPAD DISP

## (undated) DEVICE — BLADELESS OBTURATOR: Brand: WECK VISTA

## (undated) DEVICE — SUTURE MCRYL SZ 3-0 L27IN ABSRB UD L17MM RB-1 1/2 CIR Y215H

## (undated) DEVICE — DRAIN SURG 15FR 100% SIL RND END PERF

## (undated) DEVICE — TROCAR: Brand: KII FIOS FIRST ENTRY

## (undated) DEVICE — COLUMN DRAPE